# Patient Record
Sex: MALE | Race: WHITE | NOT HISPANIC OR LATINO
[De-identification: names, ages, dates, MRNs, and addresses within clinical notes are randomized per-mention and may not be internally consistent; named-entity substitution may affect disease eponyms.]

---

## 2017-05-11 ENCOUNTER — HOSPITAL (OUTPATIENT)
Dept: OTHER | Age: 62
End: 2017-05-11
Attending: EMERGENCY MEDICINE

## 2017-05-11 LAB
ANALYZER ANC (IANC): ABNORMAL
ANION GAP SERPL CALC-SCNC: 16 MMOL/L (ref 10–20)
BASOPHILS # BLD: 0 THOUSAND/MCL (ref 0–0.3)
BASOPHILS NFR BLD: 0 %
BUN SERPL-MCNC: 10 MG/DL (ref 6–20)
BUN/CREAT SERPL: 14 (ref 7–25)
CALCIUM SERPL-MCNC: 9.4 MG/DL (ref 8.4–10.2)
CHLORIDE: 96 MMOL/L (ref 98–107)
CO2 SERPL-SCNC: 30 MMOL/L (ref 21–32)
CREAT SERPL-MCNC: 0.71 MG/DL (ref 0.67–1.17)
DIFFERENTIAL METHOD BLD: ABNORMAL
EOSINOPHIL # BLD: 0 THOUSAND/MCL (ref 0.1–0.5)
EOSINOPHIL NFR BLD: 0 %
ERYTHROCYTE [DISTWIDTH] IN BLOOD: 13.2 % (ref 11–15)
GLUCOSE SERPL-MCNC: 139 MG/DL (ref 65–99)
HEMATOCRIT: 43.3 % (ref 39–51)
HGB BLD-MCNC: 14.9 GM/DL (ref 13–17)
LYMPHOCYTES # BLD: 1.2 THOUSAND/MCL (ref 1–4)
LYMPHOCYTES NFR BLD: 14 %
MCH RBC QN AUTO: 33.4 PG (ref 26–34)
MCHC RBC AUTO-ENTMCNC: 34.4 GM/DL (ref 32–36.5)
MCV RBC AUTO: 97.1 FL (ref 78–100)
MONOCYTES # BLD: 1.3 THOUSAND/MCL (ref 0.3–0.9)
MONOCYTES NFR BLD: 15 %
NEUTROPHILS # BLD: 6.3 THOUSAND/MCL (ref 1.8–7.7)
NEUTROPHILS NFR BLD: 71 %
NEUTS SEG NFR BLD: ABNORMAL %
PERCENT NRBC: ABNORMAL
PLATELET # BLD: 265 THOUSAND/MCL (ref 140–450)
POTASSIUM SERPL-SCNC: 4.1 MMOL/L (ref 3.4–5.1)
RBC # BLD: 4.46 MILLION/MCL (ref 4.5–5.9)
SODIUM SERPL-SCNC: 138 MMOL/L (ref 135–145)
WBC # BLD: 8.9 THOUSAND/MCL (ref 4.2–11)

## 2017-06-02 PROBLEM — E04.1 THYROID NODULE: Status: ACTIVE | Noted: 2017-06-02

## 2017-10-10 PROCEDURE — 81001 URINALYSIS AUTO W/SCOPE: CPT | Performed by: INTERNAL MEDICINE

## 2018-01-29 PROBLEM — N52.01 ERECTILE DYSFUNCTION DUE TO ARTERIAL INSUFFICIENCY: Status: ACTIVE | Noted: 2018-01-29

## 2018-12-31 PROCEDURE — 86060 ANTISTREPTOLYSIN O TITER: CPT | Performed by: INTERNAL MEDICINE

## 2019-07-09 PROCEDURE — 86334 IMMUNOFIX E-PHORESIS SERUM: CPT | Performed by: INTERNAL MEDICINE

## 2019-07-09 PROCEDURE — 84165 PROTEIN E-PHORESIS SERUM: CPT | Performed by: INTERNAL MEDICINE

## 2019-07-09 PROCEDURE — 83883 ASSAY NEPHELOMETRY NOT SPEC: CPT | Performed by: INTERNAL MEDICINE

## 2019-10-21 PROBLEM — Z87.891 HX OF TOBACCO USE, PRESENTING HAZARDS TO HEALTH: Status: ACTIVE | Noted: 2019-10-21

## 2021-02-04 PROBLEM — I70.0 AORTIC ATHEROSCLEROSIS (HCC): Status: ACTIVE | Noted: 2021-02-04

## 2021-02-04 PROBLEM — Z87.891 PERSONAL HISTORY OF NICOTINE DEPENDENCE: Status: ACTIVE | Noted: 2019-10-21

## 2021-02-04 PROBLEM — J84.10 LUNG GRANULOMA (HCC): Status: ACTIVE | Noted: 2021-02-04

## 2021-02-04 PROBLEM — I70.0 AORTIC ATHEROSCLEROSIS: Status: ACTIVE | Noted: 2021-02-04

## 2021-09-29 PROBLEM — S92.334A NONDISPLACED FRACTURE OF THIRD METATARSAL BONE, RIGHT FOOT, INITIAL ENCOUNTER FOR CLOSED FRACTURE: Status: ACTIVE | Noted: 2021-09-29

## 2021-10-22 PROBLEM — Z78.9 ALCOHOL CONSUMPTION OF MORE THAN FOUR DRINKS PER DAY: Status: ACTIVE | Noted: 2021-10-22

## 2021-11-04 PROBLEM — M80.00XD AGE-RELATED OSTEOPOROSIS WITH CURRENT PATHOL FX WITH ROUTINE HEALING: Status: ACTIVE | Noted: 2021-11-04

## 2021-11-04 PROBLEM — M85.89 OSTEOPENIA OF MULTIPLE SITES: Status: ACTIVE | Noted: 2021-11-04

## 2022-01-06 PROBLEM — Z87.81 HISTORY OF FOOT FRACTURE: Status: ACTIVE | Noted: 2022-01-06

## 2022-02-10 PROBLEM — R73.03 PREDIABETES: Status: ACTIVE | Noted: 2022-02-10

## 2022-02-10 PROBLEM — M85.89 OSTEOPENIA OF MULTIPLE SITES: Status: RESOLVED | Noted: 2021-11-04 | Resolved: 2022-02-10

## 2022-02-10 PROBLEM — N52.01 ERECTILE DYSFUNCTION DUE TO ARTERIAL INSUFFICIENCY: Status: RESOLVED | Noted: 2018-01-29 | Resolved: 2022-02-10

## 2022-02-10 PROBLEM — S92.334A NONDISPLACED FRACTURE OF THIRD METATARSAL BONE, RIGHT FOOT, INITIAL ENCOUNTER FOR CLOSED FRACTURE: Status: RESOLVED | Noted: 2021-09-29 | Resolved: 2022-02-10

## 2024-07-12 RX ORDER — TIZANIDINE 4 MG/1
1 TABLET ORAL EVERY 8 HOURS PRN
COMMUNITY

## 2024-07-12 RX ORDER — GABAPENTIN 300 MG/1
300 CAPSULE ORAL 3 TIMES DAILY
COMMUNITY
Start: 2024-05-10

## 2024-07-12 RX ORDER — TRAMADOL HYDROCHLORIDE 50 MG/1
1-2 TABLET ORAL NIGHTLY
COMMUNITY

## 2024-07-12 RX ORDER — ASPIRIN 81 MG/1
81 TABLET ORAL DAILY
Status: ON HOLD | COMMUNITY
End: 2024-08-02

## 2024-07-31 ENCOUNTER — ANESTHESIA EVENT (OUTPATIENT)
Dept: SURGERY | Facility: HOSPITAL | Age: 69
End: 2024-07-31
Payer: MEDICARE

## 2024-08-01 ENCOUNTER — APPOINTMENT (OUTPATIENT)
Dept: GENERAL RADIOLOGY | Facility: HOSPITAL | Age: 69
End: 2024-08-01
Attending: ORTHOPAEDIC SURGERY
Payer: MEDICARE

## 2024-08-01 ENCOUNTER — HOSPITAL ENCOUNTER (INPATIENT)
Facility: HOSPITAL | Age: 69
LOS: 1 days | Discharge: HOME | End: 2024-08-02
Attending: ORTHOPAEDIC SURGERY | Admitting: ORTHOPAEDIC SURGERY
Payer: MEDICARE

## 2024-08-01 ENCOUNTER — ANESTHESIA (OUTPATIENT)
Dept: SURGERY | Facility: HOSPITAL | Age: 69
End: 2024-08-01
Payer: MEDICARE

## 2024-08-01 DIAGNOSIS — M48.061 SPINAL STENOSIS OF LUMBAR REGION, UNSPECIFIED WHETHER NEUROGENIC CLAUDICATION PRESENT: Primary | ICD-10-CM

## 2024-08-01 PROCEDURE — 4A11X4G MONITORING OF PERIPHERAL NERVOUS ELECTRICAL ACTIVITY, INTRAOPERATIVE, EXTERNAL APPROACH: ICD-10-PCS | Performed by: ORTHOPAEDIC SURGERY

## 2024-08-01 PROCEDURE — 76000 FLUOROSCOPY <1 HR PHYS/QHP: CPT | Performed by: ORTHOPAEDIC SURGERY

## 2024-08-01 PROCEDURE — 0QS104Z REPOSITION SACRUM WITH INTERNAL FIXATION DEVICE, OPEN APPROACH: ICD-10-PCS | Performed by: ORTHOPAEDIC SURGERY

## 2024-08-01 PROCEDURE — 0QS004Z REPOSITION LUMBAR VERTEBRA WITH INTERNAL FIXATION DEVICE, OPEN APPROACH: ICD-10-PCS | Performed by: ORTHOPAEDIC SURGERY

## 2024-08-01 PROCEDURE — 0SB40ZZ EXCISION OF LUMBOSACRAL DISC, OPEN APPROACH: ICD-10-PCS | Performed by: ORTHOPAEDIC SURGERY

## 2024-08-01 PROCEDURE — 0SG30AJ FUSION OF LUMBOSACRAL JOINT WITH INTERBODY FUSION DEVICE, POSTERIOR APPROACH, ANTERIOR COLUMN, OPEN APPROACH: ICD-10-PCS | Performed by: ORTHOPAEDIC SURGERY

## 2024-08-01 PROCEDURE — 01NB0ZZ RELEASE LUMBAR NERVE, OPEN APPROACH: ICD-10-PCS | Performed by: ORTHOPAEDIC SURGERY

## 2024-08-01 RX ORDER — METHOCARBAMOL 750 MG/1
750 TABLET, FILM COATED ORAL 3 TIMES DAILY
Status: DISCONTINUED | OUTPATIENT
Start: 2024-08-01 | End: 2024-08-02

## 2024-08-01 RX ORDER — NALOXONE HYDROCHLORIDE 0.4 MG/ML
0.08 INJECTION, SOLUTION INTRAMUSCULAR; INTRAVENOUS; SUBCUTANEOUS AS NEEDED
Status: CANCELLED | OUTPATIENT
Start: 2024-08-01 | End: 2024-08-01

## 2024-08-01 RX ORDER — HYDRALAZINE HYDROCHLORIDE 20 MG/ML
10 INJECTION INTRAMUSCULAR; INTRAVENOUS EVERY 6 HOURS PRN
Status: DISCONTINUED | OUTPATIENT
Start: 2024-08-01 | End: 2024-08-02

## 2024-08-01 RX ORDER — ACETAMINOPHEN 10 MG/ML
INJECTION, SOLUTION INTRAVENOUS
Status: COMPLETED
Start: 2024-08-01 | End: 2024-08-01

## 2024-08-01 RX ORDER — LISINOPRIL 20 MG/1
20 TABLET ORAL DAILY
Status: DISCONTINUED | OUTPATIENT
Start: 2024-08-01 | End: 2024-08-02

## 2024-08-01 RX ORDER — DIPHENHYDRAMINE HCL 25 MG
25 CAPSULE ORAL EVERY 4 HOURS PRN
Status: DISCONTINUED | OUTPATIENT
Start: 2024-08-01 | End: 2024-08-02

## 2024-08-01 RX ORDER — METHOCARBAMOL 100 MG/ML
INJECTION, SOLUTION INTRAMUSCULAR; INTRAVENOUS
Status: COMPLETED
Start: 2024-08-01 | End: 2024-08-01

## 2024-08-01 RX ORDER — KETAMINE HYDROCHLORIDE 50 MG/ML
INJECTION, SOLUTION INTRAMUSCULAR; INTRAVENOUS AS NEEDED
Status: DISCONTINUED | OUTPATIENT
Start: 2024-08-01 | End: 2024-08-01 | Stop reason: SURG

## 2024-08-01 RX ORDER — BISACODYL 10 MG
10 SUPPOSITORY, RECTAL RECTAL
Status: DISCONTINUED | OUTPATIENT
Start: 2024-08-01 | End: 2024-08-02

## 2024-08-01 RX ORDER — SODIUM CHLORIDE, SODIUM LACTATE, POTASSIUM CHLORIDE, CALCIUM CHLORIDE 600; 310; 30; 20 MG/100ML; MG/100ML; MG/100ML; MG/100ML
INJECTION, SOLUTION INTRAVENOUS CONTINUOUS
Status: DISCONTINUED | OUTPATIENT
Start: 2024-08-01 | End: 2024-08-02

## 2024-08-01 RX ORDER — SODIUM CHLORIDE, SODIUM LACTATE, POTASSIUM CHLORIDE, CALCIUM CHLORIDE 600; 310; 30; 20 MG/100ML; MG/100ML; MG/100ML; MG/100ML
INJECTION, SOLUTION INTRAVENOUS CONTINUOUS
Status: DISCONTINUED | OUTPATIENT
Start: 2024-08-01 | End: 2024-08-01 | Stop reason: HOSPADM

## 2024-08-01 RX ORDER — HYDROMORPHONE HYDROCHLORIDE 1 MG/ML
0.2 INJECTION, SOLUTION INTRAMUSCULAR; INTRAVENOUS; SUBCUTANEOUS EVERY 5 MIN PRN
Status: DISCONTINUED | OUTPATIENT
Start: 2024-08-01 | End: 2024-08-01 | Stop reason: HOSPADM

## 2024-08-01 RX ORDER — METOCLOPRAMIDE HYDROCHLORIDE 5 MG/ML
10 INJECTION INTRAMUSCULAR; INTRAVENOUS EVERY 8 HOURS PRN
Status: DISCONTINUED | OUTPATIENT
Start: 2024-08-01 | End: 2024-08-02

## 2024-08-01 RX ORDER — POLYETHYLENE GLYCOL 3350 17 G/17G
17 POWDER, FOR SOLUTION ORAL DAILY PRN
Status: DISCONTINUED | OUTPATIENT
Start: 2024-08-01 | End: 2024-08-02

## 2024-08-01 RX ORDER — HYDROCODONE BITARTRATE AND ACETAMINOPHEN 5; 325 MG/1; MG/1
2 TABLET ORAL ONCE AS NEEDED
Status: DISCONTINUED | OUTPATIENT
Start: 2024-08-01 | End: 2024-08-01 | Stop reason: HOSPADM

## 2024-08-01 RX ORDER — ONDANSETRON 2 MG/ML
INJECTION INTRAMUSCULAR; INTRAVENOUS AS NEEDED
Status: DISCONTINUED | OUTPATIENT
Start: 2024-08-01 | End: 2024-08-01 | Stop reason: SURG

## 2024-08-01 RX ORDER — METOCLOPRAMIDE HYDROCHLORIDE 5 MG/ML
10 INJECTION INTRAMUSCULAR; INTRAVENOUS EVERY 8 HOURS PRN
Status: DISCONTINUED | OUTPATIENT
Start: 2024-08-01 | End: 2024-08-01 | Stop reason: HOSPADM

## 2024-08-01 RX ORDER — HYDROMORPHONE HYDROCHLORIDE 1 MG/ML
INJECTION, SOLUTION INTRAMUSCULAR; INTRAVENOUS; SUBCUTANEOUS AS NEEDED
Status: DISCONTINUED | OUTPATIENT
Start: 2024-08-01 | End: 2024-08-01 | Stop reason: SURG

## 2024-08-01 RX ORDER — HYDROMORPHONE HYDROCHLORIDE 1 MG/ML
0.8 INJECTION, SOLUTION INTRAMUSCULAR; INTRAVENOUS; SUBCUTANEOUS EVERY 2 HOUR PRN
Status: DISCONTINUED | OUTPATIENT
Start: 2024-08-01 | End: 2024-08-02

## 2024-08-01 RX ORDER — HYDROMORPHONE HYDROCHLORIDE 1 MG/ML
0.4 INJECTION, SOLUTION INTRAMUSCULAR; INTRAVENOUS; SUBCUTANEOUS EVERY 2 HOUR PRN
Status: DISCONTINUED | OUTPATIENT
Start: 2024-08-01 | End: 2024-08-02

## 2024-08-01 RX ORDER — HYDROMORPHONE HYDROCHLORIDE 1 MG/ML
0.6 INJECTION, SOLUTION INTRAMUSCULAR; INTRAVENOUS; SUBCUTANEOUS EVERY 5 MIN PRN
Status: DISCONTINUED | OUTPATIENT
Start: 2024-08-01 | End: 2024-08-01 | Stop reason: HOSPADM

## 2024-08-01 RX ORDER — OXYCODONE AND ACETAMINOPHEN 10; 325 MG/1; MG/1
1-2 TABLET ORAL EVERY 4 HOURS PRN
Qty: 30 TABLET | Refills: 0 | Status: SHIPPED | OUTPATIENT
Start: 2024-08-01

## 2024-08-01 RX ORDER — SODIUM CHLORIDE, SODIUM LACTATE, POTASSIUM CHLORIDE, CALCIUM CHLORIDE 600; 310; 30; 20 MG/100ML; MG/100ML; MG/100ML; MG/100ML
INJECTION, SOLUTION INTRAVENOUS CONTINUOUS
Status: CANCELLED | OUTPATIENT
Start: 2024-08-01

## 2024-08-01 RX ORDER — LABETALOL HYDROCHLORIDE 5 MG/ML
INJECTION, SOLUTION INTRAVENOUS AS NEEDED
Status: DISCONTINUED | OUTPATIENT
Start: 2024-08-01 | End: 2024-08-01 | Stop reason: SURG

## 2024-08-01 RX ORDER — SODIUM CHLORIDE, SODIUM LACTATE, POTASSIUM CHLORIDE, CALCIUM CHLORIDE 600; 310; 30; 20 MG/100ML; MG/100ML; MG/100ML; MG/100ML
INJECTION, SOLUTION INTRAVENOUS CONTINUOUS
Status: DISCONTINUED | OUTPATIENT
Start: 2024-08-01 | End: 2024-08-01

## 2024-08-01 RX ORDER — ONDANSETRON 2 MG/ML
4 INJECTION INTRAMUSCULAR; INTRAVENOUS EVERY 6 HOURS PRN
Status: DISCONTINUED | OUTPATIENT
Start: 2024-08-01 | End: 2024-08-01 | Stop reason: HOSPADM

## 2024-08-01 RX ORDER — HYDROMORPHONE HYDROCHLORIDE 1 MG/ML
INJECTION, SOLUTION INTRAMUSCULAR; INTRAVENOUS; SUBCUTANEOUS
Status: COMPLETED
Start: 2024-08-01 | End: 2024-08-01

## 2024-08-01 RX ORDER — ATORVASTATIN CALCIUM 20 MG/1
20 TABLET, FILM COATED ORAL NIGHTLY
Status: DISCONTINUED | OUTPATIENT
Start: 2024-08-01 | End: 2024-08-02

## 2024-08-01 RX ORDER — HYDROMORPHONE HYDROCHLORIDE 1 MG/ML
0.4 INJECTION, SOLUTION INTRAMUSCULAR; INTRAVENOUS; SUBCUTANEOUS EVERY 5 MIN PRN
Status: CANCELLED | OUTPATIENT
Start: 2024-08-01 | End: 2024-08-01

## 2024-08-01 RX ORDER — METHOCARBAMOL 100 MG/ML
1000 INJECTION, SOLUTION INTRAMUSCULAR; INTRAVENOUS ONCE
Status: COMPLETED | OUTPATIENT
Start: 2024-08-01 | End: 2024-08-01

## 2024-08-01 RX ORDER — ACETAMINOPHEN 10 MG/ML
1000 INJECTION, SOLUTION INTRAVENOUS ONCE
Status: COMPLETED | OUTPATIENT
Start: 2024-08-01 | End: 2024-08-01

## 2024-08-01 RX ORDER — LIDOCAINE HYDROCHLORIDE 10 MG/ML
INJECTION, SOLUTION EPIDURAL; INFILTRATION; INTRACAUDAL; PERINEURAL AS NEEDED
Status: DISCONTINUED | OUTPATIENT
Start: 2024-08-01 | End: 2024-08-01 | Stop reason: SURG

## 2024-08-01 RX ORDER — SENNOSIDES 8.6 MG
17.2 TABLET ORAL NIGHTLY
Status: DISCONTINUED | OUTPATIENT
Start: 2024-08-01 | End: 2024-08-02

## 2024-08-01 RX ORDER — GABAPENTIN 300 MG/1
300 CAPSULE ORAL 3 TIMES DAILY
Status: DISCONTINUED | OUTPATIENT
Start: 2024-08-01 | End: 2024-08-02

## 2024-08-01 RX ORDER — DIPHENHYDRAMINE HYDROCHLORIDE 50 MG/ML
25 INJECTION INTRAMUSCULAR; INTRAVENOUS EVERY 4 HOURS PRN
Status: DISCONTINUED | OUTPATIENT
Start: 2024-08-01 | End: 2024-08-02

## 2024-08-01 RX ORDER — ACETAMINOPHEN 500 MG
1000 TABLET ORAL ONCE
Status: DISCONTINUED | OUTPATIENT
Start: 2024-08-01 | End: 2024-08-01 | Stop reason: HOSPADM

## 2024-08-01 RX ORDER — TRANEXAMIC ACID 10 MG/ML
INJECTION, SOLUTION INTRAVENOUS AS NEEDED
Status: DISCONTINUED | OUTPATIENT
Start: 2024-08-01 | End: 2024-08-01 | Stop reason: SURG

## 2024-08-01 RX ORDER — HYDROMORPHONE HYDROCHLORIDE 1 MG/ML
0.4 INJECTION, SOLUTION INTRAMUSCULAR; INTRAVENOUS; SUBCUTANEOUS EVERY 5 MIN PRN
Status: DISCONTINUED | OUTPATIENT
Start: 2024-08-01 | End: 2024-08-01 | Stop reason: HOSPADM

## 2024-08-01 RX ORDER — ENEMA 19; 7 G/133ML; G/133ML
1 ENEMA RECTAL ONCE AS NEEDED
Status: DISCONTINUED | OUTPATIENT
Start: 2024-08-01 | End: 2024-08-02

## 2024-08-01 RX ORDER — OXYCODONE AND ACETAMINOPHEN 10; 325 MG/1; MG/1
2 TABLET ORAL EVERY 4 HOURS PRN
Status: DISCONTINUED | OUTPATIENT
Start: 2024-08-01 | End: 2024-08-02

## 2024-08-01 RX ORDER — VANCOMYCIN HYDROCHLORIDE 1 G/20ML
INJECTION, POWDER, LYOPHILIZED, FOR SOLUTION INTRAVENOUS AS NEEDED
Status: DISCONTINUED | OUTPATIENT
Start: 2024-08-01 | End: 2024-08-01 | Stop reason: HOSPADM

## 2024-08-01 RX ORDER — METHOCARBAMOL 750 MG/1
750 TABLET, FILM COATED ORAL 3 TIMES DAILY
Qty: 40 TABLET | Refills: 0 | Status: SHIPPED | OUTPATIENT
Start: 2024-08-01

## 2024-08-01 RX ORDER — DOCUSATE SODIUM 100 MG/1
100 CAPSULE, LIQUID FILLED ORAL 2 TIMES DAILY
Status: DISCONTINUED | OUTPATIENT
Start: 2024-08-01 | End: 2024-08-02

## 2024-08-01 RX ORDER — OXYCODONE AND ACETAMINOPHEN 10; 325 MG/1; MG/1
1 TABLET ORAL EVERY 4 HOURS PRN
Status: DISCONTINUED | OUTPATIENT
Start: 2024-08-01 | End: 2024-08-02

## 2024-08-01 RX ORDER — ACETAMINOPHEN 500 MG
1000 TABLET ORAL ONCE AS NEEDED
Status: DISCONTINUED | OUTPATIENT
Start: 2024-08-01 | End: 2024-08-01 | Stop reason: HOSPADM

## 2024-08-01 RX ORDER — DEXAMETHASONE SODIUM PHOSPHATE 4 MG/ML
VIAL (ML) INJECTION AS NEEDED
Status: DISCONTINUED | OUTPATIENT
Start: 2024-08-01 | End: 2024-08-01 | Stop reason: SURG

## 2024-08-01 RX ORDER — HYDROMORPHONE HYDROCHLORIDE 1 MG/ML
0.2 INJECTION, SOLUTION INTRAMUSCULAR; INTRAVENOUS; SUBCUTANEOUS EVERY 5 MIN PRN
Status: CANCELLED | OUTPATIENT
Start: 2024-08-01 | End: 2024-08-01

## 2024-08-01 RX ORDER — ALENDRONATE SODIUM 70 MG/1
70 TABLET ORAL WEEKLY
COMMUNITY
Start: 2023-09-01

## 2024-08-01 RX ORDER — NALOXONE HYDROCHLORIDE 0.4 MG/ML
0.08 INJECTION, SOLUTION INTRAMUSCULAR; INTRAVENOUS; SUBCUTANEOUS AS NEEDED
Status: DISCONTINUED | OUTPATIENT
Start: 2024-08-01 | End: 2024-08-01 | Stop reason: HOSPADM

## 2024-08-01 RX ORDER — HYDROMORPHONE HYDROCHLORIDE 1 MG/ML
0.6 INJECTION, SOLUTION INTRAMUSCULAR; INTRAVENOUS; SUBCUTANEOUS EVERY 5 MIN PRN
Status: CANCELLED | OUTPATIENT
Start: 2024-08-01 | End: 2024-08-01

## 2024-08-01 RX ORDER — MIDAZOLAM HYDROCHLORIDE 1 MG/ML
INJECTION INTRAMUSCULAR; INTRAVENOUS AS NEEDED
Status: DISCONTINUED | OUTPATIENT
Start: 2024-08-01 | End: 2024-08-01 | Stop reason: SURG

## 2024-08-01 RX ORDER — ONDANSETRON 2 MG/ML
4 INJECTION INTRAMUSCULAR; INTRAVENOUS EVERY 6 HOURS PRN
Status: DISCONTINUED | OUTPATIENT
Start: 2024-08-01 | End: 2024-08-02

## 2024-08-01 RX ORDER — HYDROCODONE BITARTRATE AND ACETAMINOPHEN 5; 325 MG/1; MG/1
1 TABLET ORAL ONCE AS NEEDED
Status: DISCONTINUED | OUTPATIENT
Start: 2024-08-01 | End: 2024-08-01 | Stop reason: HOSPADM

## 2024-08-01 RX ADMIN — KETAMINE HYDROCHLORIDE 25 MG: 50 INJECTION, SOLUTION INTRAMUSCULAR; INTRAVENOUS at 10:10:00

## 2024-08-01 RX ADMIN — TRANEXAMIC ACID 1000 MG: 10 INJECTION, SOLUTION INTRAVENOUS at 08:08:00

## 2024-08-01 RX ADMIN — HYDROMORPHONE HYDROCHLORIDE 0.5 MG: 1 INJECTION, SOLUTION INTRAMUSCULAR; INTRAVENOUS; SUBCUTANEOUS at 10:04:00

## 2024-08-01 RX ADMIN — LIDOCAINE HYDROCHLORIDE 50 MG: 10 INJECTION, SOLUTION EPIDURAL; INFILTRATION; INTRACAUDAL; PERINEURAL at 07:36:00

## 2024-08-01 RX ADMIN — HYDROMORPHONE HYDROCHLORIDE 0.5 MG: 1 INJECTION, SOLUTION INTRAMUSCULAR; INTRAVENOUS; SUBCUTANEOUS at 10:11:00

## 2024-08-01 RX ADMIN — MIDAZOLAM HYDROCHLORIDE 2 MG: 1 INJECTION INTRAMUSCULAR; INTRAVENOUS at 07:34:00

## 2024-08-01 RX ADMIN — DEXAMETHASONE SODIUM PHOSPHATE 8 MG: 4 MG/ML VIAL (ML) INJECTION at 08:01:00

## 2024-08-01 RX ADMIN — ONDANSETRON 4 MG: 2 INJECTION INTRAMUSCULAR; INTRAVENOUS at 10:04:00

## 2024-08-01 RX ADMIN — LABETALOL HYDROCHLORIDE 5 MG: 5 INJECTION, SOLUTION INTRAVENOUS at 10:25:00

## 2024-08-01 NOTE — PLAN OF CARE
ACCOMPANIED BY: self  Need signed ABC form 18 years and older  FEEDING: milk - 1%        Diet - good variety of foods  SLEEPIN hrs. at night  U/O:  normal  STOOLS: normal  SCHOOL HISTORY:  School - [employer]  Grade - college sophomore  Academic performance - normal  Extracurricular Activities - No                  CONCERNS:    Denies known Latex allergy or symptoms of Latex sensitivity.  History   Smoking Status   • Never Smoker   Smokeless Tobacco   • Not on file     Comment: no smokers            PACU admission. Dressing is clean, dry, and intact. Gel ice in place. Alert and oriented x4. Room air. Continuous pulse oximeter. Bilateral RUSTY's and SCD's in place. Up with 1-2 assist and walker. Last bowel movement 8/1. Regular diet. Physical and occupational therapy evaluations pending for 8/2. Discussed long-term and short-term goals. Spouse at bedside for support. Plan is home when medically stable.

## 2024-08-01 NOTE — BRIEF OP NOTE
Pre-Operative Diagnosis: STENOSIS LUMBAR     Post-Operative Diagnosis: STENOSIS LUMBAR      Procedure Performed:   LEFT LUMBAR 4 - LUMBAR 5 LAMINECTOMY, LUMBAR 5 - SACRUM 1 TRANSFORAMINAL LUMBAR INTERBODY FUSION BILATERAL    Surgeons and Role:     * Juan Romo MD - Primary    Assistant(s):  PA: Corazon Howard PA     Surgical Findings: lumbar stenosis     Specimen: none     Estimated Blood Loss: Blood Output: 50 mL (8/1/2024 10:22 AM)      Dictation Number:      NENO Arnold  8/1/2024  10:42 AM

## 2024-08-01 NOTE — DISCHARGE INSTRUCTIONS
Juan Romo MD        Lumbar and Thoracic Spinal Fusion Discharge Instructions   [facility] Center Telephone Number: [phone]  Activity  Mobility  Walking is highly encouraged throughout the day, as tolerated.   Smaller distances are more easily tolerated.  Your goal is to increase the distance you walk each day.  Remember to listen to your body.  Exercise caution in adverse weather or terrain conditions.    Stairs  You may ascend and descend stairs as you tolerate.   Be safe and deliberate. Hold the handrail and advance one step at a time.  Avoid step-stools and ladders.     Restrictions  No twisting, pulling, pushing, or lifting items with a force greater than 10 pounds. (~Gallon of Milk)  No lifting objects above the level of your shoulders.  No bending with your back - you may bend at the knees and hips, maintaining a straight back.  Typically, your restrictions will be lessened at your 6-week follow-up appointment, however we appreciate your compliance with restrictions until directed otherwise.   If you have been provided a walking aid such as a walker or cane, please use as directed.    Sitting  Fatigue is common after surgery and you may find respite in sitting. This is normal and encouraged. Please remember to be deliberately mobile throughout the day. Change your position frequently, as your muscles may get sore and stiff without movement.    It is recommended to sit in a chair which allows your knees to be at about the same level as your hips. This makes rising from a seated position more feasible.   Avoid overstuffed or plush chairs. Medium to firm chairs with straight backs give better support.   Recliners are OK but you may need to add pillows to avoid sinking into the chair.  Use a raised toilet seat if needed.  Change position every 20-30 minutes throughout the day (example: after sitting, stand, then you can sit again for another 20-30 minutes).    Sexual Activity  You may resume presurgical  sexual activity two weeks following surgery as tolerated and when approved by your surgeon.  Discontinue sexual activity if it causes or exacerbates your pain.     Exercise/Fitness Activity  Do not participate in this activity during the two weeks following surgery, unless instructed otherwise.  Your surgeon will lessen restrictions and progress your activity level when appropriate.     Driving  You may NOT drive while taking narcotics or muscle relaxants.  Back and leg pain have been shown to decrease breaking reaction time, particularly after surgery, however, there is no specific time to return to driving.   When you feel able and safe to drive you may return to driving. Slowly advance the complexity of your driving from short distance driving on local streets and in parking lots to longer distance driving including highway driving.     Travel  Avoid air travel and long-distance automobile travel the first two weeks following surgery.     Bracing/Corset  You may not require an immobilizer or brace for your back unless your surgeon has indicated otherwise.   If you have been provided a brace or corset  Use when out of bed except when showering.  You may wear even when toileting.  Anticipate wearing for 6-12 weeks after surgery; exact time to be determined by surgeon.   Apply/remove your brace when sitting/standing at side of your bed.     Sleeping  You will require plenty of rest and sleep after surgery.   Use the position that provides you the most comfort.  You may use a pillow under knees, between legs, or behind back (if lying on your side), for comfort.   Log roll to turn and rise from a recumbent position.   You may have difficulty sleeping at night. This is not abnormal. If you experience this, try to avoid napping during the day.   It is common to fatigue easily after surgery, this will typically improve one month after your surgery.   Maintain clean sheets and pillow cases.   Rub, with a clean towel to  dry.     Return to work  When cleared by surgeon. Discuss specific work activities with your surgeon at your follow-up visit.  Light/sedentary type work may be possible 2 weeks post-op.  Most work activities are permitted approximately 6-12 weeks after surgery.     Dressing and Wound Care  You will have a water-resistant, clear, adhesive dressing over your wound. Please leave this dressing in place for 5 days after surgery.   Remove dressing 5 days after surgery and inspect your wound. You will find glue and mesh tape over your wound, please leave these intact. The dressing should remain in place after discharge from the hospital, as long as it is intact, with a good seal, and there is no leakage.    Start dressing changes 5 days after leaving the hospital with gauze and tegaderm.  Change as needed for moisture in the dressing.    You may start showering 3 days after leaving the hospital.  Keep all dressings on while taking a shower, then place a clean and dry dressing after showering. Do not scrub over the dressing.  Always wash hands before and after dressing changes.   Watch incision for any worsening redness, increased drainage, increased warmth or opening of the incision.  Call your surgeon’s office/answering service if you notice any of these.  Do not apply lotions or ointments on or near incision.    Bathing  You may shower over the adhesive dressing (Tegaderm) that is in place starting 3 days after surgery.  Have someone inspect your dressing prior to each shower to ensure the integrity of the seal at the dressing edges. If the bandage integrity is compromised, please apply an additional adhesive over the existing dressing.   Do not submerge your wound. No tub bathing, swimming, use of steam rooms or saunas for 6 weeks following surgery and when permitted by your surgeon.  Do not scrub your incision site however you may allow soapy water to run over the site after your first post-operative visit.   Dab the  site dry with a clean towel.    Diet and Constipation Prevention  Your appetite may be poor. Your desire for food will return.  Drink plenty of liquids (water, juice) each day to prevent dehydration.  Maintain a healthy, well balanced diet that includes plenty of protein following surgery.  Foods that are high in fiber (bran, vegetables, fruit) are good ways to prevent constipation.   Use an over the counter stool softener while taking narcotics to prevent constipation; the use of medications such a Miralax or Milk of Magnesia may be needed.  An enema or glycerin suppository may be necessary if above measures do not work.  Contact your pharmacist, family doctor, or surgeon for advice.    No Smoking  Smoking will inhibit healing.  Smoking has been clearly shown to inhibit solid bony fusion and is counterproductive to the nature of your surgery.   Even one cigarette a day may cause problems.  Vaping, chewing tobacco, nicotine gum and patches will also inhibit healing.    Pain Management  Expectations  You will have incisional site pain. This is normal and expected after surgery.   You may continue to have leg symptoms which should improve over time.   It is not uncommon 48-72 hours after surgery for patients to experience worsening pain symptoms or return of leg pain/symptoms as post-surgical inflammation sets in. Do not be alarmed as this should gradually improve.   Pain after surgery is normal.  You may have some return of your pre-surgery symptoms and this is normal. Overall your pain should slowly decrease.  You may have good and bad days.  The pain medication Oxycodone may be taken every 4-6 hours as needed for pain.    You can take one extra strength (500mg) Acetaminophen every 6 hours as needed for pain, do not exceed 3500 mg per 24 hours.  If you received Norco instead of Oxycodone; you can take 1-2 tablets of Norco as needed for pain. Norco contains 325 mg of Acetaminophen, so factor that in when  supplementing extra strength Tylenol (500mg) Acetaminophen, to not exceed the 3500mg limit per 24 hours.  The muscle relaxer (Flexeril) should be taken 1-3 times daily as needed for muscle tightness and spasm.  You MUST NOT take any anti-inflammatory pain medications for 3-6 months after surgery as these can prevent spinal fusion.  Which include: Aspirin, Motrin, Advil, Aleve, Naprosyn, Voltaren, Mobic, Indocin, Meloxicam, Ibuprofen, Indomethacin, Naproxen, Diclofenac. (COMPLETE LIST IN GUIDEBOOK)      Non-medication Based Techniques  Relaxation techniques  A way to focus your attention other than on your pain. You are innately capable of producing endorphins, a naturally occurring hormone, that can decrease pain. Some examples of relaxation techniques which may result in a release of endorphins include deep breathing, listening to music, prayer, or meditation.   The use of cold therapy for 20 minutes multiple times per day is encouraged.  You may apply heat to areas of muscle spasm only. Do not apply heat directly over your wound as this can lead to swelling and increased pain.   Gentle stretching may ease muscle spasm.  The idea is to “lengthen” the muscle that is in spasm. Avoid any aggressive bending, lifting, twisting with your neck or back. Gently bending and stretching is OK.  Gentle massage applied to the muscle spasm may help reduce discomfort.    Medication  Tylenol (acetaminophen) is another excellent medication for pain. Take caution as most narcotics contain acetaminophen. You may take a combined daily maximum 3.5 grams (3500mg) of Tylenol (acetaminophen) in 24 hours. More than this can lead to liver injury.   You will be prescribed a narcotic medication. Take this as needed for breakthrough pain. Gradually wean yourself from prescription medication. You may substitute for Tylenol (acetaminophen).  Consider taking pain medication 30 minutes prior to activity to avoid incisional pain.   Take muscle  relaxants as needed only if experiencing muscle spasm.  Please allow medications 30-45 minutes to take effect.  Do NOT drink alcohol while on pain medication.  Monitor need for pain medication refills closely.   Call your pharmacy or physician’s office at least five days before you run out of pain medication. If calling physician office after 3 pm, requests will be addressed on the next business day. Calls for refills on Fridays, holidays, and weekends may result in a processing delay of your refill until the next business day.     Post-op Office Visit  Patients are routinely seen 2 weeks, 6 weeks, 3 months, 6 months, and 1 year after surgery.   You will be scheduled for a post-surgical visit two weeks after surgery. Please confirm this appointment.  It is very important that you keep this appointment.  We recommend making a list of questions to bring with you as it is not uncommon for patients to forget questions while being seen.  Schedule a one week follow up with your Primary Care Physician. It is a good opportunity to review all your medications including any new additions we may provide.     When to contact your surgeon’s team:  Temperature > 101.5°F or 38.5°C.   Increasing pain, swelling, redness, odor, or drainage from your incision.  Separation of incision.  Sudden reappearance of pain that won’t go away with pain medication.  New numbness or weakness to arms or legs.  Difficulty urinating or if incontinence of your bowel.   Headaches that worsen when standing/sitting and resolve when laid flat.  Any concerns, unanswered questions, or new problems.     Go directly to the ER or CALL 911 if you:  become short of breath  have chest pain  cough up blood  have unexplained anxiety with breathing

## 2024-08-01 NOTE — H&P
HPI:   Irving Mayer is a 68 year old male who presents for a pre-operative physical exam. Patient is to have lumbar fusion, to be done by Dr. Romo at [facility] on 8/1/24.          Revised Cardiac Risk Index (modified Pat Index):             High-risk surgical procedure (intraperitoneal, intrathoracic, suprainguinal vascular) No   History of MI or positive stress test, current chest pain secondary to myocardiac ischemia, current nitrate therapy, or EKG with pathologic Q wave No   History of CHF, pulmonary edema, PND, current rales, S3 gallop, chest xray with pulmonary vascular redistribution No   History of CVA/TIA No   Preoperative treatment with insulin No   Preoperative creatinine > 2.0 No   Score 0      Risk of major cardiac complication: 0.4%     RCRI     0 = 0.4%               1 = 0.9%               2 = 6.6%               3+ = 11%  % is defined as myocardial infarction, pulmonary edema, ventricular fibrillation/primary cardiac arrest, or complete heart block.       Patient reports no:  Chest pain  Chest discomfort  Jaw pain  Jaw discomfort  Arm pain   Arm discomfort  Shortness of breath   Dyspnea on exertion  Paroxysmal nocturnal dyspnea  Orthopnea  Palpations  Edema     Previous hx of cardiac or vascular issues are: HTN, HLD            Current Outpatient Medications   Medication Sig Dispense Refill    alendronate 70 MG Oral Tab Take 1 tablet (70 mg total) by mouth once a week. 12 tablet 4    traMADol 50 MG Oral Tab 1-2 tablets before bed. 30 tablet 0    gabapentin 300 MG Oral Cap Take 2 capsules (600 mg total) by mouth 3 (three) times daily. BEGIN ONE CAPSULE AT BEDTIME FOR DAY 1-3 DAY 4-6 TAKE ONE CAPSULE TWICE DAILY DAY 7 AND THEREAFTER- TAKE ONE CAPSULE THREE TIMES DAILY 60 capsule 0    tiZANidine 4 MG Oral Tab Take 1 tablet (4 mg total) by mouth every 8 (eight) hours as needed. 90 tablet 0    lisinopril 20 MG Oral Tab TAKE 1 TABLET BY MOUTH DAILY 90 tablet 0    simvastatin 40 MG Oral Tab  TAKE 1 TABLET BY MOUTH EVERY NIGHT 90 tablet 3    aspirin (ASPIRIN EC LOW DOSE) 81 MG Oral Tab EC Take 1 tablet (81 mg total) by mouth 2 (two) times a day. 28 tablet 0    Mesalamine 4 g Rectal Enema Place 1 enema (4 g total) rectally every evening. Insert at bedtime and attempt to retain all night. 30 enema 0    Sildenafil Citrate 50 MG Oral Tab Take 1 tablet (50 mg total) by mouth daily as needed for Erectile Dysfunction. 30 tablet 2    Calcium Carbonate-Vit D-Min (CALTRATE 600+D PLUS MINERALS) 600-800 MG-UNIT Oral Chew Tab Chew 1 tablet by mouth 2 (two) times a day.   0      Allergies:   Allergies   Seasonal                OTHER (SEE COMMENTS)    Comment:Grass , outside causes itchy eyes, stuffy nose           Past Medical History:   Diagnosis Date    Chronic airway obstruction, not elsewhere classified      Chronic obstructive pulmonary disease, unspecified COPD type ([facility]) 2016    Colitis      COPD (chronic obstructive pulmonary disease) ([facility])      DDD (degenerative disc disease), lumbosacral 2014    Essential hypertension 2016    Essential hypertension with goal blood pressure less than 130/80 2016    High blood pressure      High cholesterol      HTN (hypertension) 10/21/2014    Hypercholesterolemia      Multiple pulmonary nodules 03/10/2015    Pure hypercholesterolemia      Tobacco abuse 10/21/2014    Vitamin D deficiency 2014            Past Surgical History:   Procedure Laterality Date    BACK SURGERY   1982    COLONOSCOPY   11/10/14= Hemorrhoids     Repeat     COLONOSCOPY   10/10/18= Hemorrhoids     Repeat     COLONOSCOPY & POLYPECTOMY        KNEE SURGERY   [facility]    KNEE SURGERY Right 2023    OTHER SURGICAL HISTORY   2017     Cystoscopy- Dr. Guerrero             Family History   Problem Relation Age of Onset    Heart Attack Father               Heart Attack Mother               Alcohol and Other Disorders Associated  Mother      Other (heart failure) Brother        Social History: Social History    Tobacco Use      Smoking status: Former        Packs/day: 0.00        Years: 1 pack/day for 40.0 years (40.0 ttl pk-yrs)        Types: Cigarettes        Start date: 3/3/1975        Quit date: 3/3/2015        Years since quittin.3      Smokeless tobacco: Never    Vaping Use      Vaping status: Never Used    Alcohol use: Yes      Alcohol/week: 60.0 standard drinks of alcohol      Types: 60 Cans of beer per week      Comment: 10 beers a day    Drug use: No            REVIEW OF SYSTEMS:   Positive in BOLD  General:  fevers/chills  Skin:  skin lesions or rashes  Eyes:  visual complaints  HEENT:  nasal congestion, sinus pain, sore throat, hearing loss  Respiratory:  shortness of breath, cough, wheezing  Cardiovascular:  chest pain, exertional dyspnea, palpitations  Gastrointestinal:  nausea/vomiting, constipation, diarrhea, rectal bleeding, heartburn  Genitourinary:  dysuria, urgency, frequency, incontinence.  Musculoskeletal:  joint pain, swelling, muscle aching.  Psych:  Depression, anxiety  Hematology:  bruising or excessive bleeding  Endocrine:   excessive thirst or urination, recent weight changes, temperature intolerance        EXAM:   /85 (BP Location: Left arm, Patient Position: Sitting, Cuff Size: adult)   Pulse 81   Wt 169 lb 3.2 oz (76.7 kg)   SpO2 97%   BMI 21.72 kg/m²   GENERAL: healthy appearing, well developed, well nourished, in no apparent distress, A&Ox3  Head:  atraumatic, normocephalic  Eyes: PERRLA,EOMI, conjunctiva appeared normal, eyelids appeared normal  ENT: Oropharynx unremarkable   Neck: supple, no thyroid masses, no cervical adenopathy   Respiratory/Chest: CTAB, no wheezing,no rales  CARDIO: RRR , S1,S2 normal, S3 abscent,no murmur  Vascular: no edema  GI/Abdomen: soft, nontender, no mass, BS present  SKIN: warm, no rash  EXTREMITIES.MS: no cyanosis, clubbing or edema, Full ROM of all extremities,  no joint swelling or tenderness  Neuro/Psych: MS 5/5 throughout, sensation intact        ASSESSMENT AND PLAN:      Irving Mayer is a 68 year old male who presents for a pre-operative physical exam. Patient is to have lumbar fusion, to be done by Dr. Romo at [facility] on 8/1/24.  Patient is cleared for surgery with the risks of the procedure and anesthesia as discussed with those specific specialists.     Diagnoses and all orders for this visit:     Preop examination  -     CBC WITH DIFFERENTIAL WITH PLATELET  -     COMPREHENSIVE METABOLIC PANEL  -     PROTHROMBIN TIME (PT) AND PARTIAL THROMBOPLASTIN TIME (PTT)  -     URINALYSIS, COMPLETE WITH MICROSCOPIC EXAMINATION WITH REFLEX TO URINE CULTURE, ROUTINE  -     ELECTROCARDIOGRAM, COMPLETE  -     XR CHEST PA + LAT CHEST (CPT=71046); Standing  -     MRSA / MSSA PRE-OP  -EKG without ischemic changes. No anginal symptoms  -      Cleared for surgery     Foraminal stenosis of lumbar region  - going for fusion 8/1     Hypercholesterolemia  - continue statin     Aortic atherosclerosis (HCC)  - Continue present management     Essential hypertension  - Continue present management     Chronic obstructive pulmonary disease, unspecified COPD type (HCC)  Personal history of nicotine dependence  - quit smoking ~10 years ago  - denies further surveillance imaging  - not requiring inhalers     Ulcerative colitis without complications, unspecified location (HCC)  - symptoms well controlled  - Continue present management     Age-related osteoporosis with current pathol fx with routine healing  - on fosamax per FLS     Primary osteoarthritis, other specified site  -     PROTHROMBIN TIME (PT) AND PARTIAL THROMBOPLASTIN TIME (PTT)     Collapsed vertebra, not elsewhere classified, lumbar region, initial encounter for fracture (HCC)  -     PROTHROMBIN TIME (PT) AND PARTIAL THROMBOPLASTIN TIME (PTT)           This consult was sent back the referring physician, Dr. Romo      Dwayne Singh MD  Internal Medicine  [facility]

## 2024-08-01 NOTE — ANESTHESIA PREPROCEDURE EVALUATION
PRE-OP EVALUATION    Patient Name: Irving Mayer    Admit Diagnosis: STENOSIS LUMBAR    Pre-op Diagnosis: STENOSIS LUMBAR    LEFT LUMBAR 4 - LUMBAR 5 LAMINECTOMY, LUMBAR 5 - SACRUM 1 TRANSFORAMINAL LUMBAR INTERBODY FUSION BILATERAL    Anesthesia Procedure: LEFT LUMBAR 4 - LUMBAR 5 LAMINECTOMY, LUMBAR 5 - SACRUM 1 TRANSFORAMINAL LUMBAR INTERBODY FUSION BILATERAL (Left: Spine Lumbar)  . (Spine Lumbar)  INTRAOPERATIVE NEURO MONITORING    Surgeons and Role:     * Juan Romo MD - Primary    Pre-op vitals reviewed.  Temp: 98.7 °F (37.1 °C)  Pulse: 74  Resp: 18  BP: 165/95  SpO2: 97 %  Body mass index is 21.34 kg/m².    Current medications reviewed.  Hospital Medications:   acetaminophen (Tylenol Extra Strength) tab 1,000 mg  1,000 mg Oral Once    lactated ringers infusion   Intravenous Continuous    ceFAZolin (Ancef) 2g in 10mL IV syringe premix  2 g Intravenous Once    clonidine-EPINEPHrine-ropivacaine-ketorolac pain cocktail syringe (OR)   Injection Once (Intra-Op)       Outpatient Medications:     Medications Prior to Admission   Medication Sig Dispense Refill Last Dose    alendronate 70 MG Oral Tab Take 1 tablet (70 mg total) by mouth once a week.   7/26/2024    gabapentin 300 MG Oral Cap Take 1 capsule (300 mg total) by mouth in the morning, at noon, and at bedtime.   7/31/2024    tiZANidine 4 MG Oral Tab Take 1 tablet (4 mg total) by mouth 3 (three) times daily.   7/31/2024    traMADol 50 MG Oral Tab Take 1 tablet (50 mg total) by mouth at bedtime.   7/25/2024    aspirin 81 MG Oral Tab EC Take 1 tablet (81 mg total) by mouth daily.   7/25/2024    Calcium Carb-Cholecalciferol (CALCIUM 500 + D OR) Take by mouth.   7/25/2024    Cyanocobalamin (VITAMIN B 12 OR) Take by mouth.   7/25/2024    SIMVASTATIN 40 MG Oral Tab TAKE 1 TABLET(40 MG) BY MOUTH EVERY NIGHT 90 tablet 0 7/30/2024    lisinopril 20 MG Oral Tab Take 1 tablet (20 mg total) by mouth daily. 90 tablet 3 7/30/2024    Sildenafil Citrate 50 MG Oral Tab  Take 1 tablet (50 mg total) by mouth daily as needed for Erectile Dysfunction. 30 tablet 2        Allergies: Seasonal      Anesthesia Evaluation    Patient summary reviewed.    Anesthetic Complications           GI/Hepatic/Renal    Negative GI/hepatic/renal ROS.                             Cardiovascular      ECG reviewed.            (+) hypertension   (+) hyperlipidemia                                  Endo/Other    Negative endo/other ROS.                              Pulmonary        (+) COPD                   Neuro/Psych    Negative neuro/psych ROS.                                  Past Surgical History:   Procedure Laterality Date    Back surgery      Colonoscopy  11/10/14= Hemorrhoids    Repeat     Colonoscopy  10/10/18= Hemorrhoids    Repeat     Colonoscopy & polypectomy      Knee surgery      [facility] [place]    Other surgical history  2017    Cystoscopy- Dr. Guerrero     Other surgical history  2023    R knee arthroscopy with partial medial menisectomy     Social History     Socioeconomic History    Marital status:     Number of children: 3    Highest education level: High school graduate   Occupational History    Occupation: animal hospital technician     Comment: retired   Tobacco Use    Smoking status: Former     Current packs/day: 0.00     Average packs/day: 1 pack/day for 40.0 years (40.0 ttl pk-yrs)     Types: Cigarettes     Start date: 3/3/1975     Quit date: 3/3/2015     Years since quittin.4    Smokeless tobacco: Never   Vaping Use    Vaping status: Never Used   Substance and Sexual Activity    Alcohol use: Yes     Alcohol/week: 60.0 standard drinks of alcohol     Types: 60 Cans of beer per week     Comment: 8-10  daily    Drug use: No     History   Drug Use No     Available pre-op labs reviewed.               Airway      Mallampati: II  Mouth opening: >3 FB  TM distance: > 6 cm  Neck ROM: full Cardiovascular    Cardiovascular exam normal.  Rhythm:  regular  Rate: normal     Dental    Dentition appears grossly intact         Pulmonary    Pulmonary exam normal.  Breath sounds clear to auscultation bilaterally.               Other findings              ASA: 2   Plan: general  NPO status verified and patient meets guidelines.    Post-procedure pain management plan discussed with surgeon and patient.      Plan/risks discussed with: patient                Present on Admission:  **None**

## 2024-08-01 NOTE — CONSULTS
General Medicine Consult      Reason for consult:    Consulted by: Juan Romo MD    PCP: Dwayne Singh MD      History of Present Illness: Patient is a 68 year old male with pmh of COPD< HTN, HL, hx tobacco abuse is being seen for periop med management after undergoing LEFT LUMBAR 4 - LUMBAR 5 LAMINECTOMY, LUMBAR 5 - SACRUM 1 TRANSFORAMINAL LUMBAR INTERBODY FUSION BILATERAL by Dr. Romo earlier today. Tolerated procedure well. Denies pain/numbness in BLE. Feeling groggy as he's still waking up from anesthesia.       PMH:  Past Medical History:    Back problem    Chronic airway obstruction, not elsewhere classified    Chronic obstructive pulmonary disease, unspecified COPD type (HCC)    COPD (chronic obstructive pulmonary disease) (HCC)    DDD (degenerative disc disease), lumbosacral    Essential hypertension    Essential hypertension with goal blood pressure less than 130/80    High blood pressure    High cholesterol    HTN (hypertension)    Hypercholesterolemia    Multiple pulmonary nodules    Pure hypercholesterolemia    Tobacco abuse    Visual impairment    Vitamin D deficiency        PSH:  Past Surgical History:   Procedure Laterality Date    Back surgery  1982    Colonoscopy  11/10/14= Hemorrhoids    Repeat 2019    Colonoscopy  10/10/18= Hemorrhoids    Repeat 2023    Colonoscopy & polypectomy      Knee surgery  1980    [facility]    Other surgical history  11/13/2017    Cystoscopy- Dr. Guerrero     Other surgical history  07/06/2023    R knee arthroscopy with partial medial menisectomy        Home Medications:  Outpatient Medications Marked as Taking for the 8/1/24 encounter (Hospital Encounter)   Medication Sig Dispense Refill    alendronate 70 MG Oral Tab Take 1 tablet (70 mg total) by mouth once a week.      oxyCODONE-acetaminophen  MG Oral Tab Take 1-2 tablets by mouth every 4 (four) hours as needed for Pain. 30 tablet 0    methocarbamol 750 MG Oral Tab Take 1 tablet (750 mg  total) by mouth 3 (three) times daily. 40 tablet 0    gabapentin 300 MG Oral Cap Take 1 capsule (300 mg total) by mouth in the morning, at noon, and at bedtime.      tiZANidine 4 MG Oral Tab Take 1 tablet (4 mg total) by mouth every 8 (eight) hours as needed.      traMADol 50 MG Oral Tab Take 1-2 mg by mouth at bedtime.      aspirin 81 MG Oral Tab EC Take 1 tablet (81 mg total) by mouth daily.      Calcium Carb-Cholecalciferol (CALCIUM 500 + D OR) Take 1 tablet by mouth daily.      Cyanocobalamin (VITAMIN B 12 OR) Take 1 tablet by mouth daily.      SIMVASTATIN 40 MG Oral Tab TAKE 1 TABLET(40 MG) BY MOUTH EVERY NIGHT 90 tablet 0    lisinopril 20 MG Oral Tab Take 1 tablet (20 mg total) by mouth daily. 90 tablet 3       Scheduled Medication:   sennosides  17.2 mg Oral Nightly    docusate sodium  100 mg Oral BID    methocarbamol  750 mg Oral TID    ceFAZolin  2 g Intravenous Q8H    gabapentin  300 mg Oral TID    lisinopril  20 mg Oral Daily    atorvastatin  20 mg Oral Nightly     Continuous Infusing Medication:   lactated ringers Stopped (24 1112)    lactated ringers       PRN Medication:  sodium chloride    polyethylene glycol (PEG 3350)    magnesium hydroxide    bisacodyl    fleet enema    ondansetron    metoclopramide    diphenhydrAMINE **OR** diphenhydrAMINE    benzocaine-menthol    HYDROmorphone **OR** HYDROmorphone    oxyCODONE-acetaminophen **OR** oxyCODONE-acetaminophen    hydrALAzine     ALL:  Allergies   Allergen Reactions    Seasonal         Soc Hx:  Social History     Tobacco Use    Smoking status: Former     Current packs/day: 0.00     Average packs/day: 1 pack/day for 40.0 years (40.0 ttl pk-yrs)     Types: Cigarettes     Start date: 3/3/1975     Quit date: 3/3/2015     Years since quittin.4    Smokeless tobacco: Never   Substance Use Topics    Alcohol use: Yes     Alcohol/week: 60.0 standard drinks of alcohol     Types: 60 Cans of beer per week     Comment: 8-10  daily        Fam Hx  Family  History   Problem Relation Age of Onset    Heart Attack Father             Heart Attack Mother             Alcohol and Other Disorders Associated Mother     Other (heart failure) Brother        Review of Systems  Comprehensive ROS reviewed and negative except for what's stated above.  Including negative for chest pain, shortness of breath, syncope.       OBJECTIVE:  BP (!) 183/88 (BP Location: Left arm)   Pulse 76   Temp 97.8 °F (36.6 °C) (Oral)   Resp 17   Ht 6' 2\" (1.88 m)   Wt 166 lb (75.3 kg)   SpO2 96%   BMI 21.31 kg/m²   General:  Alert, no distress, appears stated age.   Head:  Normocephalic, without obvious abnormality, atraumatic.   Eyes:  Sclera anicteric, No conjunctival pallor, EOMs intact.    Nose: Nares normal. Septum midline. Mucosa normal. No drainage.   Throat: Lips, mucosa, and tongue normal. Teeth and gums normal.   Neck: Supple, symmetrical, trachea midline, no cervical or supraclavicular lymph adenopathy, thyroid: no enlargment/tenderness/nodules appreciated   Lungs:   Clear to auscultation bilaterally. Normal effort   Chest wall:  No tenderness or deformity.   Heart:  Regular rate and rhythm, S1, S2 normal, no murmur, rub or gallop appreciated   Abdomen:   Soft, non-tender. Bowel sounds normal. No masses,  No organomegaly. Non distended   Extremities: Extremities normal, atraumatic, no cyanosis or edema.   Skin: Skin color, texture, turgor normal. No rashes or lesions.    Neurologic: Normal strength, no focal deficit appreciated       Diagnostics:   CBC/Chem  No results for input(s): \"WBC\", \"HGB\", \"MCV\", \"PLT\", \"BAND\", \"INR\" in the last 168 hours.    Invalid input(s): \"LYM#\", \"MONO#\", \"BASOS#\", \"EOSIN#\"    No results for input(s): \"NA\", \"K\", \"CL\", \"CO2\", \"BUN\", \"CREATSERUM\", \"GLU\", \"CA\", \"CAION\", \"MG\", \"PHOS\" in the last 168 hours.    No results for input(s): \"ALT\", \"AST\", \"ALB\", \"AMYLASE\", \"LIPASE\", \"LDH\" in the last 168 hours.    Invalid input(s): \"ALPHOS\", \"TBIL\",  \"DBIL\", \"TPROT\"    Radiology:   All imaging personally reviewed      XR FLUOROSCOPY C-ARM TIME LESS THAN 1 HOUR (CPT=76000)    Result Date: 8/1/2024  PROCEDURE:  XR FLUOROSCOPY C-ARM TIME <1 HOUR  (CPT=76000)  INDICATIONS:  Lumbar fusion  COMPARISON:  None.   TECHNIQUE:  FLUOROSCOPY IMAGES OBTAINED:  11 FLUOROSCOPY TIME:  2 minutes 14 seconds TECHNOLOGIST TIME:  2 hours 30 minutes RADIATION DOSE (AIR KERMA PRODUCT):  115.48mGy   FINDINGS:  Fluoroscopy provided for guidance. No radiologist was present for the procedure. There are expected intraoperative changes present. Please refer to the operative report for further details.            CONCLUSION:  See above.   LOCATION:  [facility]    Dictated by (CST): Tylor Mitchell MD on 8/01/2024 at 10:21 AM     Finalized by (CST): Tylor Mitchell MD on 8/01/2024 at 10:21 AM            ASSESSMENT / PLAN:    68 year old male with pmh of COPD< HTN, HL, hx tobacco abuse is being seen for periop med management after undergoing LEFT LUMBAR 4 - LUMBAR 5 LAMINECTOMY, LUMBAR 5 - SACRUM 1 TRANSFORAMINAL LUMBAR INTERBODY FUSION BILATERAL     S/p LEFT LUMBAR 4 - LUMBAR 5 LAMINECTOMY, LUMBAR 5 - SACRUM 1 TRANSFORAMINAL LUMBAR INTERBODY FUSION BILATERAL   - PO/IV meds for pain control per surgery, wean to oral meds as able  - Adv diet as tolerated per surgery, zofran prn for nausea, OBR  - PT/OT/SW  - monitor for acute blood loss anemia, cbc in am  - sSCD for DVT prophy  - further management per surgery    Essential Hypertension  - BP parameters placed for BP meds to prevent hypotension  - Monitor on IV pain medications due to venodilatory effect  - Hemodynamically stable  - Resume PTA antihypertensives (lisinopril)     COPD  - home inhalers prn     HTN  - statin    Hx tobacco abuse      Outpatient records reviewed confirming patient's medical history and medications.     Further recommendations pending patient's clinical course.  [facility] hospitalist to continue to follow patient while in house    Patient  and/or patient's family given opportunity to ask questions and note understanding and agreeing with therapeutic plan as outlined      Thank you for allowing me to participate in the care of this patient.     Thank You,      Hui Anna MD  [facility]  Message over [employer]  Pager: [phone]

## 2024-08-01 NOTE — OPERATIVE REPORT
Operative Note     Patient Name:Irving Mayer  Date: 8/1/2024  Preoperative Diagnosis:   Lumbar Stenosis with Neurogenic Claudication  Lumbar degenerative segmental kyphosis   Lumbar Foraminal Stenosis with Radiculopathy    Postoperative Diagnosis: Same  Primary Surgeon: Juan Romo MD  Assistant: Corazon LAZCANO    Procedures: L5-S1 TLIF with smith karimi osteotomy, L4-5 laminoforaminotomy     - Lumbar decompression/TLIF L5-S1      CPT 33559  - Posterior Instrumentation L5-S1                      CPT 40427  - Interbody biomechanical device      CPT 22853 x 1  - L4-5 laminoforaminotomy       CPT 76947  - L5-S1 smith karimi osteotomy      CPT 63406  - Use of Local Bone Autograft                            CPT 65822   - Use of Allograft Material        CPT 05710        Anesthesia: General Endotracheal Anesthesia  Estimated Blood Loss: 50cc  Drains: None  Implants: Atec pedicle screws x 4 rods x 2, PSX cage x 2  Bone Graft:DBM Allograft autograft   Specimen: None  Condition: Stable  Complications: None      Surgical Indications:Irving Mayer is a  68 year old malewho presented with a history of lumbar stenosis and neurogenic claudication/radiculopathy in the setting of lumbar spondylolisthesis, with symptoms refractory to nonsurgical care.  The workup including lumbar radiographs and MRI demonstrating spondylolisthesis with stenosis. The option of surgery was discussed with the patient.  Risks, benefits, and alternatives of surgery were discussed with the patient, which include but are not limited to bleeding, infection, DVT/PE, dural tears, neurologic injury, worsening of neurological status, risk of instability, need for subsequent surgery, fracture, failure of fusion, hardware failure, risks associated with anesthesia, including death, which were all reviewed with the patient and she consented to proceed with surgery.     Surgical Procedure:              The patient was met in the preop holding  area, we reviewed elements of the patient's history and physical examination along with the planned surgical procedure. The patient was in agreement and the surgical site was marked with indelible ink.  The patient was administered prophylactic antibiotics per SCIP guidelines.  After successful induction of general endotracheal anesthesia, a leon catheter was placed and sequential compression devices were applied to bilateral lower extremities.  The patient was then placed in the prone position on the Aden spine frame.  The patient's orbits, peripheral nerves, and bony prominences were padded and protected.  The back was then prepped and draped in the usual sterile fashion.  A safety timeout was performed identifying the patient by name, MRN, and date of birth; the nature of the procedure, surgical site, and concerns were addressed with the operating room staff.  All were in agreement and we proceeded with the procedure.    Using a spinal needle a fluoroscopic image  was taken to localize the skin incision relative to the patient's anatomy. The skin was incised with a scalpel and subperiosteal dissection was carried out with electrocautery exposing the L4/5 left interlaminar space. Curved curette was placed in the L4-5 left interlaminar space and fluoroscopic image confirmed appropriate level.  A partial laminotomy was performed on the left side at L4/5 until the top of the ligamentum flavum was exposed. Ligamentum was then released with a curved curette and then removed .  A partial medial facetectomy was performed out to the medial wall of the  pedicle.  The ligamentum flavum was removed en bloc. A rebollar ball could be passed around the thecal sac, root, and out to the neural foramen without any resistance.This completed the laminoforaminotomy.    Next attention turned to the L5-S1 TLIF.                  AP and lateral flouroscopic images were taken throughout the procedure to aid in instrumentation placement.  Perfect APs werer used to carlito out the lateral border of the pedicles bilaterally at the proposed treated levels. Skin incision was made 1cm lateral to this marks. Electocautery was used to dissect through the subcutaneous tissue and the fascia. Blunt finger dissection was used to develop the plane between the multifidus and remaining paraspinal muscles until the facet joint, pars and transverse process was palpated. Pedicle screws were placed using standard fluoroscopically guided pedicle screw technique.  Jamshidis' were placed at the lateral border of the pedicle and inserted to a depth of 25-30mm using AP flouro imaging. Kwires were then inserted. This was completed for all the remaining pedicles bilaterally. Lateral flouro image was taken to ensure appropriate wire trajectory. Pedicles screws of appropriate diameter and length (as assessed using pre operative imaging) were then placed at L5-S1 and positioned confirmed with AP/Lat flouro images and EMG probing of screw heads. All screws simulated >12mAMPs.  This was mixed with our local bone graft and allograft to improve osteogenic potential.      With the appropriate screws placed, attention was directed to the L5-S1 smith karimi osteotomy. A table mounted retractor was secured to the bilateral screws and secured to the table for rigidity. A medial blade was used after elevation of the multifidus off the lamina. The inferior articular process of L5 and superior and the superior articular process was osteotomized. Bone was removed, milled and saved for later bone grafting. A central laminectomy was performed with high speed bur until top of ligamentum flavum was exposed.  The ligamentum flavum was removed exposing the dura medially and decompressing the traversing nerve root. This completed the barboza karimi osteotomy.     Next, attention turned to the L5-S1 TLIF. The exiting nerve root was exposed and decompressed.  The thecal sac and nerve roots were then  protected with neurosponges, and gently retracted to reveal the annulus of the intervertebral disk. An annulotomy was performed.  With the sequential use of pituitary rongeurs, disc kristin, a variety of curettes, and rasps, a thorough discectomy ensued with care not to violate the subchondral endplates of the adjacent vertebral bodies. With the endplates prepared, the disc space was then appropriately sized with sequential trialing under fluoroscopic control. The integrity of the anterior annulus was inspected and then biologic allograft and local bone graft was delivered to the anterior disc space. An appropriate corresponding interbody implant was then selected, packed with bone graft on the back table, and then implanted under fluoroscopic control. The remaining disk space was then backfilled with bone graft. The wound was then copiously irrigated. 100mg of vanco powder was used in the wound,  the remaining floseal and placed over the nerve roots.      2 rods of appropriate length were selected and contoured to shape and reduced to the tulip heads. The caps were then tightened to the screw 's predetermined specification utilizing a torque . The construct was the evaluated fluoroscopically and determined to be appropriate.                   All bleeders were controlled using bipolar electrocautery and floseal.  There was no active bleeding.  Closure was done in layers with interrupted 0 Vicryl for the fascia, 2-0 Vicryl for the subcutaneous tissue.  The subcutaneous layer was injected with 0.375% Marcaine and the skin was closed with staples.  Xeroform and a sterile dressing were then applied.  The patient was woken from anesthesia and transferred to the PACU in stable condition.                   A physician assistant was necessary during the case to provide positioning, exposure, hemostasis, safety and retraction and to manage wound suction so that I could operate with two hands. The PA aided  in screw targeting on their side and taye insertion     Juan Romo MD  Division of Spine Surgery  [facility]

## 2024-08-01 NOTE — ANESTHESIA PROCEDURE NOTES
Airway  Date/Time: 8/1/2024 7:40 AM  Urgency: elective    Airway not difficult    General Information and Staff    Patient location during procedure: OR  Anesthesiologist: Cleveland Conklin MD  Performed: anesthesiologist   Performed by: Cleveland Conklin MD  Authorized by: Cleveland Conklin MD      Indications and Patient Condition  Indications for airway management: anesthesia  Sedation level: deep  Preoxygenated: yes  Patient position: sniffing  Mask difficulty assessment: 1 - vent by mask    Final Airway Details  Final airway type: endotracheal airway      Successful airway: ETT  Cuffed: yes   Successful intubation technique: direct laryngoscopy  Endotracheal tube insertion site: oral  Blade: Barbie  Blade size: #3  ETT size (mm): 7.5    Cormack-Lehane Classification: grade I - full view of glottis  Placement verified by: capnometry   Cuff volume (mL): 5  Measured from: lips  ETT to lips (cm): 22  Number of attempts at approach: 1  Number of other approaches attempted: 0

## 2024-08-01 NOTE — ANESTHESIA POSTPROCEDURE EVALUATION
[facility]    Irving Mayer Patient Status:  Inpatient   Age/Gender 68 year old male MRN [id number]   Location [facility] POST ANESTHESIA CARE UNIT Attending Juan Romo MD   Hosp Day # 0 PCP Dwayne Singh MD       Anesthesia Post-op Note    LEFT LUMBAR 4 - LUMBAR 5 LAMINECTOMY, LUMBAR 5 - SACRUM 1 TRANSFORAMINAL LUMBAR INTERBODY FUSION BILATERAL    Procedure Summary       Date: 08/01/24 Room / Location:  MAIN OR 11 / [facility] MAIN OR    Anesthesia Start: 0734 Anesthesia Stop:     Procedures:       LEFT LUMBAR 4 - LUMBAR 5 LAMINECTOMY, LUMBAR 5 - SACRUM 1 TRANSFORAMINAL LUMBAR INTERBODY FUSION BILATERAL (Left: Spine Lumbar)      . (Spine Lumbar)      INTRAOPERATIVE NEURO MONITORING Diagnosis: (STENOSIS LUMBAR)    Surgeons: Juan Romo MD Anesthesiologist: Cleveland Conklin MD    Anesthesia Type: general ASA Status: 2            Anesthesia Type: general    Vitals Value Taken Time   /93 08/01/24 1112   Temp 98.9 °F (37.2 °C) 08/01/24 1112   Pulse 73 08/01/24 1112   Resp 15 08/01/24 1112   SpO2 93 % 08/01/24 1112   Vitals shown include unfiled device data.    Patient Location: PACU    Anesthesia Type: general    Airway Patency: patent    Postop Pain Control: adequate    Mental Status: mildly sedated but able to meaningfully participate in the post-anesthesia evaluation    Nausea/Vomiting: none    Cardiopulmonary/Hydration status: stable euvolemic    Complications: no apparent anesthesia related complications    Postop vital signs: stable    Dental Exam: Unchanged from Preop    Patient to be discharged from PACU when criteria met.

## 2024-08-02 VITALS
OXYGEN SATURATION: 98 % | DIASTOLIC BLOOD PRESSURE: 61 MMHG | WEIGHT: 166 LBS | RESPIRATION RATE: 16 BRPM | BODY MASS INDEX: 21.3 KG/M2 | HEART RATE: 61 BPM | SYSTOLIC BLOOD PRESSURE: 118 MMHG | TEMPERATURE: 98 F | HEIGHT: 74 IN

## 2024-08-02 LAB
HCT VFR BLD AUTO: 36 %
HGB BLD-MCNC: 12 G/DL

## 2024-08-02 PROCEDURE — 97165 OT EVAL LOW COMPLEX 30 MIN: CPT

## 2024-08-02 PROCEDURE — 85014 HEMATOCRIT: CPT | Performed by: PHYSICIAN ASSISTANT

## 2024-08-02 PROCEDURE — 85018 HEMOGLOBIN: CPT | Performed by: PHYSICIAN ASSISTANT

## 2024-08-02 PROCEDURE — 97535 SELF CARE MNGMENT TRAINING: CPT

## 2024-08-02 PROCEDURE — 97530 THERAPEUTIC ACTIVITIES: CPT

## 2024-08-02 PROCEDURE — 97116 GAIT TRAINING THERAPY: CPT

## 2024-08-02 PROCEDURE — 97161 PT EVAL LOW COMPLEX 20 MIN: CPT

## 2024-08-02 RX ORDER — ASPIRIN 81 MG/1
81 TABLET ORAL DAILY
Status: SHIPPED | COMMUNITY
Start: 2024-08-06

## 2024-08-02 NOTE — PROGRESS NOTES
Patient cleared for discharge. Discharge education completed at bedside with patient and spouse, all questions answered. IV removed. Belongings sent home with patient.

## 2024-08-02 NOTE — DISCHARGE SUMMARY
[facility] Hospitalist Discharge Summary     Patient ID:  Irving Mayer  68 year old  8/10/1955    Admit date: 8/1/2024    Discharge date and time: 08/02/24     Attending Physician: Juan Romo MD     Primary Care Physician: Dwayne Singh MD     Discharge Diagnoses: STENOSIS LUMBAR  Spinal stenosis, lumbar    Please note that only [facility] DMG and EMG patients enrolled in the Medicare ACO, [facility] ACO and [facility] HMOs will be handled by the [facility] Care Management team.  For all other patients, please follow usual protocol for discharge care transition.    Discharge Condition: stable    Disposition:  home    Important Follow up:  - PCP within 2 weeks              Hospital Course:        68 year old male with pmh of COPD< HTN, HL, hx tobacco abuse is being seen for periop med management after undergoing LEFT LUMBAR 4 - LUMBAR 5 LAMINECTOMY, LUMBAR 5 - SACRUM 1 TRANSFORAMINAL LUMBAR INTERBODY FUSION BILATERAL by Dr. Romo earlier today. Tolerated procedure well. Denies pain/numbness in BLE. Feeling groggy as he's still waking up from anesthesia.     S/p LEFT LUMBAR 4 - LUMBAR 5 LAMINECTOMY, LUMBAR 5 - SACRUM 1 TRANSFORAMINAL LUMBAR INTERBODY FUSION BILATERAL   - PO/IV meds for pain control per surgery, wean to oral meds as able  - Adv diet as tolerated per surgery, zofran prn for nausea, OBR  - PT/OT/SW  - resume pta asa when cleared by ortho  - sSCD for DVT prophy  - further management per surgery     Essential Hypertension  - BP parameters placed for BP meds to prevent hypotension  - Monitor on IV pain medications due to venodilatory effect  - Hemodynamically stable  - Resume PTA antihypertensives (lisinopril)      COPD  - home inhalers prn      HTN  - statin     Hx tobacco abuse    Consults: IP CONSULT TO HOSPITALIST  IP CONSULT TO RESPIRATORY CARE    Operative Procedures: Procedure(s) (LRB):  LEFT LUMBAR 4 - LUMBAR 5 LAMINECTOMY, LUMBAR 5 - SACRUM 1  TRANSFORAMINAL LUMBAR INTERBODY FUSION BILATERAL (Left)  . (N/A)  INTRAOPERATIVE NEURO MONITORING (N/A)       Patient instructions:      I as the attending physician reconciled the current and discharge medications on day of discharge.     Current Discharge Medication List        START taking these medications    Details   oxyCODONE-acetaminophen  MG Oral Tab Take 1-2 tablets by mouth every 4 (four) hours as needed for Pain.      methocarbamol 750 MG Oral Tab Take 1 tablet (750 mg total) by mouth 3 (three) times daily.           CONTINUE these medications which have NOT CHANGED    Details   alendronate 70 MG Oral Tab Take 1 tablet (70 mg total) by mouth once a week.      gabapentin 300 MG Oral Cap Take 1 capsule (300 mg total) by mouth in the morning, at noon, and at bedtime.      tiZANidine 4 MG Oral Tab Take 1 tablet (4 mg total) by mouth every 8 (eight) hours as needed.      traMADol 50 MG Oral Tab Take 1-2 mg by mouth at bedtime.      aspirin 81 MG Oral Tab EC Take 1 tablet (81 mg total) by mouth daily.      Calcium Carb-Cholecalciferol (CALCIUM 500 + D OR) Take 1 tablet by mouth daily.      Cyanocobalamin (VITAMIN B 12 OR) Take 1 tablet by mouth daily.      SIMVASTATIN 40 MG Oral Tab TAKE 1 TABLET(40 MG) BY MOUTH EVERY NIGHT      lisinopril 20 MG Oral Tab Take 1 tablet (20 mg total) by mouth daily.      Sildenafil Citrate 50 MG Oral Tab Take 1 tablet (50 mg total) by mouth daily as needed for Erectile Dysfunction.             Activity: activity as tolerated  Diet: regular diet  Wound Care: as directed  Code Status: No Order      Discharge Exam:     General: no acute distress, alert and oriented x 3  Heart: RRR  Lungs: clear bilaterally, no active wheezing  Abdomen: nontender, nondistended, intact BS  Extremities: no pedal edema   Neuro: CN inact, no focal deficits      Total time coordinating care for discharge: Greater than 30 minutes    Hui Anna MD  [facility]

## 2024-08-02 NOTE — PLAN OF CARE
A&Ox4. VSS. On room air. . IS encouraged. Teds and SCDs. Ankle pumps encouraged. Tolerating diet. Last BM today. Voiding. Pain managed with PO medication. Surgical dressing to back C/D/I. Ambulating with standby assist. Plan is for PT/OT eval. Patient updated and in agreement with plan of care. Safety precautions in place. Instructed patient to call for assistance, call light within reach.

## 2024-08-02 NOTE — OCCUPATIONAL THERAPY NOTE
OCCUPATIONAL THERAPY EVALUATION - INPATIENT    Room Number: 357/357-A  Evaluation Date: 8/2/2024     Type of Evaluation: Initial  Presenting Problem: s/p L5-S1 TLIF with smith karimi osteotomy, L4-5 laminoforaminotomy 8/1/24    Physician Order: IP Consult to Occupational Therapy  Reason for Therapy:  ADL/IADL Dysfunction and Discharge Planning      OCCUPATIONAL THERAPY ASSESSMENT   Patient is a 68 year old male admitted on 8/1/2024 with Presenting Problem: s/p L5-S1 TLIF with smith karimi osteotomy, L4-5 laminoforaminotomy 8/1/24. Co-Morbidities : HTN, COPD, spinal stenosis  Patient is currently functioning near baseline with toileting, upper body dressing, lower body dressing, grooming, transfers, static sitting balance, dynamic sitting balance, static standing balance, dynamic standing balance, maintaining seated position, functional standing tolerance, energy conservation strategies, and aerobic capacity.  Prior to admission, patient's baseline is IND c ADLs/IADLs. Pt lives at home with supportive spouse and is retired. Pt was driving prior to this admission.  Patient met all OT goals at supervision level.  Patient reports no further questions/concerns at this time.     Patient will be discharged from acute OT services at this time.      WEIGHT BEARING RESTRICTION  Weight Bearing Restriction: None                Recommendations for nursing staff:   Transfers: up c x1 person, RW, supervision  Toileting location: Toilet    EVALUATION SESSION:  Patient at start of session: Seated upright in chair.  FUNCTIONAL TRANSFER ASSESSMENT  Sit to Stand: Chair  Chair: Supervision  Toilet Transfer: Stand-by Assist    BED MOBILITY     BALANCE ASSESSMENT  Static Sitting: Supervision  Sitting Bilateral: Supervision  Static Standing: Supervision  Standing Bilateral: Supervision    FUNCTIONAL ADL ASSESSMENT  Eating: Independent  Grooming Standing: Supervision  UB Dressing Seated: Supervision  LB Dressing Seated: Modified  Independent (utilized reacher and trialed sock aide)  LB Dressing Standing: Supervision  Toileting Seated: Supervision      ACTIVITY TOLERANCE: Pt completed UB and LB dressing DYLAN with use of reacher and sock aide, completed sit <> stand with supervision and RW, and ambulated to bathroom to complete toilet transfer with supervision. Pt reported a pain rating of 3/10 at beginning of session.                         O2 SATURATIONS       COGNITION  Overall Cognitive Status:  WFL - within functional limits  COGNITION ASSESSMENTS       Upper Extremity:   ROM: within functional limits  Strength: is within functional limits  Coordination:  Gross motor: WFL  Fine motor: WFL  Sensation: Light touch:  intact    EDUCATION PROVIDED  Patient: Role of Occupational Therapy; Plan of Care; Discharge Recommendations; Adaptive Equipment Recommendations; DME Recommendations; Functional Transfer Techniques; Fall Prevention; Surgical Precautions; Posture/Positioning; Compensatory ADL Techniques; Energy Conservation; Proper Body Mechanics  Patient's Response to Education: Verbalized Understanding; Returned Demonstration  Family/Caregiver: -- (Same)  Family/Caregiver's Response to Education: Verbalized Understanding    Equipment used: RW, reacher, sock aide  Demonstrates functional use    Therapist comments: Pt pleasant and motivated during session today. Pt asked to recall spinal precautions; pt demonstrated ability to remember \"No BLTs\". Pt completed LB dressing with supervision, use of reacher, and verbal cues for proper technique to maintain spinal precautions. Pt trialed use of sock aide for donning socks; pt unsuccessful with attempt as foot began to get stuck and reports his spouse will be willing to assist him when needed. Pt completed UB dressing seated in chair with supervision. Pt completed sit <> stand transfer from chair with RW and supervision to prepare for ambulation to bathroom to complete toilet transfer. Pt completed  toilet transfer with RW and supervision and verbal cues to utilize grab bar for extra support. Pt demonstrated good body mechanics to maintain spinal precautions when lowering self onto toilet. Pt educated on wiping techniques to maintain spinal precautions; pt verbalized understanding. Pt recommended on DME/AE to obtain for safe completion of ADLs once home; pt verbalized understanding and in agreement. Pt spouse present in room during session and reports she is willing to assist with pt when needed. Pt received AE/DME handout at end of session.    Patient End of Session: Up in chair;Needs met;Call light within reach;RN aware of session/findings;All patient questions and concerns addressed;Alarm set;Family present    OCCUPATIONAL PROFILE    HOME SITUATION  Type of Home: House  Home Layout: Two level  Lives With: Spouse;Family    Toilet and Equipment: Standard height toilet;Comfort height toilet  Shower/Tub and Equipment: Tub-shower combo  Other Equipment: None    Occupation/Status: Retired  Hand Dominance: Left  Drives: Yes  Patient Regularly Uses: Glasses    Prior Level of Function: previously IND c ADLs/IADLs. Pt is retired and lives at home with supportive spouse. Pt was driving prior to this admission.    SUBJECTIVE  \"This is gonna cost me an arm and a leg\" in a jokingly manner when mentioning AE/DME.    PAIN ASSESSMENT  Rating: 3  Location: Lower back region  Management Techniques: Ice;Body mechanics;Activity promotion;Repositioning    OBJECTIVE  Precautions: Spine;Bed/chair alarm  Fall Risk: Standard fall risk    WEIGHT BEARING RESTRICTION  Weight Bearing Restriction: None                AM-PAC ‘6-Clicks’ Inpatient Daily Activity Short Form  -   Putting on and taking off regular lower body clothing?: A Little  -   Bathing (including washing, rinsing, drying)?: A Little  -   Toileting, which includes using toilet, bedpan or urinal? : None  -   Putting on and taking off regular upper body clothing?: None  -    Taking care of personal grooming such as brushing teeth?: None  -   Eating meals?: None    AM-PAC Score:  Score: 22  Approx Degree of Impairment: 25.8%  Standardized Score (AM-PAC Scale): 47.1      ADDITIONAL TESTS     NEUROLOGICAL FINDINGS        PLAN   Patient has been evaluated and presents with no skilled Occupational Therapy needs at this time.  Patient discharged from Occupational Therapy services.  Please re-order if a new functional limitation presents during this admission.      Patient Evaluation Complexity Level:   Occupational Profile/Medical History LOW - Brief history including review of medical or therapy records    Specific performance deficits impacting engagement in ADL/IADL LOW  1 - 3 performance deficits    Client Assessment/Performance Deficits MODERATE - Comorbidities and min to mod modifications of tasks    Clinical Decision Making LOW - Analysis of occupational profile, problem-focused assessments, limited treatment options    Overall Complexity LOW     OT Session Time: 30 minutes  Self-Care Home Management: 18 minutes  Therapeutic Activity:  10 minutes

## 2024-08-02 NOTE — PHYSICAL THERAPY NOTE
PHYSICAL THERAPY EVALUATION - INPATIENT     Room Number: 357/357-A  Evaluation Date: 8/2/2024  Type of Evaluation: Initial  Physician Order: PT Eval and Treat    Presenting Problem: TLIF & L4-L5 lami  Co-Morbidities : HTN, COPD, spinal stenosis  Reason for Therapy: Mobility Dysfunction and Discharge Planning    PHYSICAL THERAPY ASSESSMENT   Patient is a 68 year old male admitted 8/1/2024 for s/p TLIF and L4-L5 fusion.   Patient is currently functioning near baseline with bed mobility, transfers, gait, stair negotiation, maintaining seated position, and standing prolonged periods. Prior to admission, patient's baseline is ind.       PLAN  Patient has been evaluated and presents with no skilled Physical Therapy needs at this time.  Patient discharged from Physical Therapy services.  Please re-order if a new functional limitation presents during this admission.    GOALS  Patient was able to achieve the following goals ...    Patient was able to transfer At previous, functional level   Patient able to ambulate on level surfaces Safely with RW         HOME SITUATION  Type of Home: House   Home Layout: Two level  Stairs to Enter : 1     Stairs to Bedroom: 16  Railing: Yes    Lives With: Spouse;Family  Drives: Yes  Patient Owned Equipment: None  Patient Regularly Uses: Glasses    Prior Level of [place]: Pt reports that he is ind with all mobility and ADLs PTA. Pt states that he lives with spouse and family who can assist as needed. Pt still driving.    SUBJECTIVE  \"BLT like the sandwich?\"      OBJECTIVE  Precautions: Spine;Bed/chair alarm  Fall Risk: Standard fall risk    WEIGHT BEARING RESTRICTION  Weight Bearing Restriction: None                PAIN ASSESSMENT  Rating: 3  Location: back  Management Techniques: Activity promotion;Body mechanics;Breathing techniques;Repositioning    COGNITION  Overall Cognitive Status:  WFL - within functional limits    RANGE OF MOTION AND STRENGTH ASSESSMENT  Upper extremity  ROM and strength are within functional limits     Lower extremity ROM is within functional limits     Lower extremity strength is within functional limits       BALANCE  Static Sitting: Good  Dynamic Sitting: Good  Static Standing: Fair -  Dynamic Standing: Fair -    ADDITIONAL TESTS                                    ACTIVITY TOLERANCE                         O2 WALK       NEUROLOGICAL FINDINGS                        AM-PAC '6-Clicks' INPATIENT SHORT FORM - BASIC MOBILITY  How much difficulty does the patient currently have...  Patient Difficulty: Turning over in bed (including adjusting bedclothes, sheets and blankets)?: None   Patient Difficulty: Sitting down on and standing up from a chair with arms (e.g., wheelchair, bedside commode, etc.): None   Patient Difficulty: Moving from lying on back to sitting on the side of the bed?: None   How much help from another person does the patient currently need...   Help from Another: Moving to and from a bed to a chair (including a wheelchair)?: None   Help from Another: Need to walk in hospital room?: A Little   Help from Another: Climbing 3-5 steps with a railing?: A Little       AM-PAC Score:  Raw Score: 22   Approx Degree of Impairment: 20.91%   Standardized Score (AM-PAC Scale): 53.28   CMS Modifier (G-Code): CJ    FUNCTIONAL ABILITY STATUS  Gait Assessment   Functional Mobility/Gait Assessment  Gait Assistance: Supervision  Distance (ft): 150  Assistive Device: Rolling walker  Pattern: Within Functional Limits  Stairs: Stairs;Car transfer  How Many Stairs: 4  Device: 1 Rail  Assist: Contact guard assist  Pattern: Ascend and Descend  Ascend and Descend : Reciprocal  Car transfer: supervision v/c for sequencing and maintaining precautions    Skilled Therapy Provided     Bed Mobility:  Rolling: NT  Supine to sit: supervision// v/c for log rolling sequencing    Sit to supine: NT     Transfer Mobility:  Sit to stand: supervision// v/c to push off bed rather than  RW   Stand to sit: supervision  Gait = see assessment above.    Therapist's comments:Pt received resting in bed and was agreeable to PT. Pt states pain is low and tolerable. Pt educated on spine precautions (no bending, lifting or twisting) and is able to recite them back. Pt able to perform bed mobility with supervision with v/c for sequencing and spine precautions. Pt stood to RW with supervision and amb 150 ft with no LOB but states a little dizziness. Pt denied a rest break in the chair. Pt ascended and descended 4 steps with reciprocal gait pattern with CGA. Pt also practiced car transfers with supervision and v/c for sequencing to maintain precautions. Pt returned to room and was left in chair. Pt educated on continued amb 4x/day with nursing staff and OOB activity. Pt to be given a RW upon discharge. RN aware.    Exercise/Education Provided:  Bed mobility  Body mechanics  Energy conservation  Functional activity tolerated  Gait training  Transfer training    Patient End of Session: Up in chair;Needs met;Call light within reach;RN aware of session/findings;All patient questions and concerns addressed;SCDs in place;Alarm set    Patient Evaluation Complexity Level:  History Low - no personal factors and/or co-morbidities   Examination of body systems Low - addressing 1-2 elements   Clinical Presentation Low - Stable   Clinical Decision Making Low Complexity       PT Session Time: 20 minutes  Gait Training: 15 minutes

## 2024-08-02 NOTE — PLAN OF CARE
Patient A & O x4. VSS, on RA. C/o mild pain, see MAR for meds given. Dressing to back has small amount of serosanguinous drainage noted. Voiding freely. Up min assist. Safety measures in place. Instructed to use call light.

## 2024-08-02 NOTE — PROGRESS NOTES
S: Patient laying in bed. States his back pain is well controlled. Denies leg pain. Denies numbness or tingling. Baseline left foot weakness. Ambulated. Voiding freely. Passing gas. Wants to go home.     Inspection:  Awake alert No acute distress. No difficulty breathing     Blood pressure 118/52, pulse 68, temperature 98.2 °F (36.8 °C), temperature source Oral, resp. rate 16, height 6' 2\" (1.88 m), weight 166 lb (75.3 kg), SpO2 97%.    Recent Labs   Lab 08/02/24  0440   HGB 12.0*   HCT 36.0*       Lumbar/Sacral Integument: Incision/ incisions;  Dressing in place,    Strength: Strength of bilateral lower extremities:     Left Right    EHL 4/5 5/5    DF 4/5 5/5    PF 5/5 5/5    Quads 5/5 5/5    IP 5/5 5/5  Sensation: No sensory deficits noted on bilateral lower extremities      HEAD/NECK: Head is normocephalic  EYES: EOMI, DONOVAN  SKIN EXAM: Skin is intact, head, neck, trunk and arms/legs. No rashes, mottling or ulcerations.  LYMPH EXAM: There is no lymph edema in either lower extremity.  VASCULAR EXAM:  Good distal perfusion. No clubbing or cyanosis.Calves supple, NT bilaterally  ABDOMINAL EXAM: Abdomen is soft, NT.      A/P: POD # 1 s/p L4-5 Lami, L5-S1 TLIF    P: Patient doing very well. Continue current management. Can be discharged home once cleared by all services. Can reviewed with Dr. Romo who agrees with the above plan.     Judit Gallego PA-C

## 2025-01-25 ENCOUNTER — APPOINTMENT (OUTPATIENT)
Dept: GENERAL RADIOLOGY | Facility: HOSPITAL | Age: 70
End: 2025-01-25
Attending: EMERGENCY MEDICINE
Payer: MEDICARE

## 2025-01-25 ENCOUNTER — HOSPITAL ENCOUNTER (EMERGENCY)
Facility: HOSPITAL | Age: 70
Discharge: HOME | End: 2025-01-25
Attending: EMERGENCY MEDICINE
Payer: MEDICARE

## 2025-01-25 VITALS
DIASTOLIC BLOOD PRESSURE: 96 MMHG | OXYGEN SATURATION: 100 % | SYSTOLIC BLOOD PRESSURE: 179 MMHG | HEART RATE: 89 BPM | RESPIRATION RATE: 20 BRPM | TEMPERATURE: 98 F

## 2025-01-25 DIAGNOSIS — S82.044A CLOSED NONDISPLACED COMMINUTED FRACTURE OF RIGHT PATELLA, INITIAL ENCOUNTER: Primary | ICD-10-CM

## 2025-01-25 PROCEDURE — 73560 X-RAY EXAM OF KNEE 1 OR 2: CPT | Performed by: EMERGENCY MEDICINE

## 2025-01-25 PROCEDURE — 99283 EMERGENCY DEPT VISIT LOW MDM: CPT

## 2025-01-25 PROCEDURE — 99284 EMERGENCY DEPT VISIT MOD MDM: CPT

## 2025-01-25 RX ORDER — HYDROCODONE BITARTRATE AND ACETAMINOPHEN 5; 325 MG/1; MG/1
1 TABLET ORAL EVERY 8 HOURS PRN
Qty: 5 TABLET | Refills: 0 | Status: SHIPPED | OUTPATIENT
Start: 2025-01-25 | End: 2025-01-30

## 2025-01-25 RX ORDER — IBUPROFEN 600 MG/1
600 TABLET, FILM COATED ORAL ONCE
Status: COMPLETED | OUTPATIENT
Start: 2025-01-25 | End: 2025-01-25

## 2025-01-25 RX ORDER — HYDROCODONE BITARTRATE AND ACETAMINOPHEN 5; 325 MG/1; MG/1
1 TABLET ORAL ONCE
Status: COMPLETED | OUTPATIENT
Start: 2025-01-25 | End: 2025-01-25

## 2025-01-25 NOTE — ED INITIAL ASSESSMENT (HPI)
S: mechanical fall onto right knee. Pt unable to stand.    B: see chart.     A: difficult to assess in triage    R: edso

## 2025-01-26 NOTE — ED PROVIDER NOTES
[facility]  Emergency Department Attending Note     Chief Complaint:   Chief Complaint   Patient presents with    Knee Pain     HISTORY OF PRESENT ILLNESS:   Irving Mayer is a 69 year old male who presents to the ED presents with a complaint of right knee pain after mechanical slip and fall.  States he slipped, landed on his right knee directly and immediately had pain.  Had difficulty bearing weight.  Denies paralysis paresthesia.  Denies obvious deformity.  Denies head trauma or LOC.     MEDICAL & SOCIAL HISTORY:   Past Medical History:    Back problem    Chronic airway obstruction, not elsewhere classified    Chronic obstructive pulmonary disease, unspecified COPD type (HCC)    COPD (chronic obstructive pulmonary disease) (HCC)    DDD (degenerative disc disease), lumbosacral    Essential hypertension    Essential hypertension with goal blood pressure less than 130/80    High blood pressure    High cholesterol    HTN (hypertension)    Hypercholesterolemia    Multiple pulmonary nodules    Pure hypercholesterolemia    Tobacco abuse    Visual impairment    Vitamin D deficiency      Past Surgical History:   Procedure Laterality Date    Back surgery  1982    Colonoscopy  11/10/14= Hemorrhoids    Repeat 2019    Colonoscopy  10/10/18= Hemorrhoids    Repeat 2023    Colonoscopy & polypectomy      Knee surgery  1980    [facility]    Other surgical history  11/13/2017    Cystoscopy- Dr. Guerrero     Other surgical history  07/06/2023    R knee arthroscopy with partial medial menisectomy      Social History     Socioeconomic History    Marital status:     Number of children: 3    Highest education level: High school graduate   Occupational History    Occupation: animal hospital technician     Comment: retired   Tobacco Use    Smoking status: Former     Current packs/day: 0.00     Average packs/day: 1 pack/day for 40.0 years (40.0 ttl pk-yrs)     Types: Cigarettes     Start date: 3/3/1975     Quit  date: 3/3/2015     Years since quittin.9    Smokeless tobacco: Never   Vaping Use    Vaping status: Never Used   Substance and Sexual Activity    Alcohol use: Yes     Alcohol/week: 60.0 standard drinks of alcohol     Types: 60 Cans of beer per week     Comment: 8-10  daily    Drug use: No     Social Drivers of Health     Food Insecurity: No Food Insecurity (2024)    Food Insecurity     Food Insecurity: Never true   Transportation Needs: No Transportation Needs (2024)    Transportation Needs     Lack of Transportation: No   Housing Stability: Low Risk  (2024)    Housing Stability     Housing Instability: No    Allergies[1]   Current Outpatient Medications   Medication Sig Dispense Refill    HYDROcodone-acetaminophen 5-325 MG Oral Tab Take 1 tablet by mouth every 8 (eight) hours as needed for Pain. 5 tablet 0    aspirin 81 MG Oral Tab EC Take 1 tablet (81 mg total) by mouth daily.      alendronate 70 MG Oral Tab Take 1 tablet (70 mg total) by mouth once a week.      oxyCODONE-acetaminophen  MG Oral Tab Take 1-2 tablets by mouth every 4 (four) hours as needed for Pain. 30 tablet 0    methocarbamol 750 MG Oral Tab Take 1 tablet (750 mg total) by mouth 3 (three) times daily. 40 tablet 0    gabapentin 300 MG Oral Cap Take 1 capsule (300 mg total) by mouth in the morning, at noon, and at bedtime.      tiZANidine 4 MG Oral Tab Take 1 tablet (4 mg total) by mouth every 8 (eight) hours as needed.      traMADol 50 MG Oral Tab Take 1-2 mg by mouth at bedtime.      Calcium Carb-Cholecalciferol (CALCIUM 500 + D OR) Take 1 tablet by mouth daily.      Cyanocobalamin (VITAMIN B 12 OR) Take 1 tablet by mouth daily.      SIMVASTATIN 40 MG Oral Tab TAKE 1 TABLET(40 MG) BY MOUTH EVERY NIGHT 90 tablet 0    lisinopril 20 MG Oral Tab Take 1 tablet (20 mg total) by mouth daily. 90 tablet 3    Sildenafil Citrate 50 MG Oral Tab Take 1 tablet (50 mg total) by mouth daily as needed for Erectile Dysfunction. 30 tablet 2           REVIEW OF SYSTEMS   A 10 point review of systems was completed and is negative except as listed in history of present illness      PHYSICAL EXAM   Vitals: BP (!) 179/96   Pulse 89   Temp 97.9 °F (36.6 °C)   Resp 20   SpO2 100%   BP (!) 179/96   Pulse 89   Temp 97.9 °F (36.6 °C)   Resp 20   SpO2 100%     General: A&Ox3, NAD  Constitutional: Well developed  Head: atraumatic, normocephalic   Eyes: conjuctiva clear, no icterus  Ears: normal external appearance, no drainage  Nose:  Atraumatic, no swelling, no drainage, nares patent  Throat:  Moist pink mucous membranes, airway is patent  Neck:  Soft supple, no masses\  Chest:  No bruising or abrasions, no tenderness, no deformity  Cardiac:  Regular rate and rhythm  Lung:  No distress, no retractions  Back: No stepoff/deformity no midline back tenderness  Extremities: Intact dorsalis pedis and posterior tibialis pulses, intact popliteal pulse, full range of motion to the digits, normal sensation to all digits and to the entire distal extremity, some tenderness just over the patella, able to lift the leg off of the cart but has tenderness and pain when he tries to flex the knee.  No laxity.  Normal distal capillary refill less than 2 seconds, full range of motion of the ankle  Neuro: No facial droop, no slurred speech, moving all extremities freely, SILT to the bilateral upper and lower extremities  Psych: A&Ox3, normal affect, cooperative, calm  Skin: No rash, no petechiae/purpura, warm, dry      RESULTS  LABS:   Results for orders placed or performed during the hospital encounter of 08/01/24   Hemoglobin & Hematocrit    Collection Time: 08/02/24  4:40 AM   Result Value Ref Range    HGB 12.0 (L) 13.0 - 17.5 g/dL    HCT 36.0 (L) 39.0 - 53.0 %         IMAGING: XR KNEE (1 OR 2 VIEWS), RIGHT (CPT=73560)    Result Date: 1/25/2025  CONCLUSION:  1. Acute nondisplaced comminuted patellar fracture.  Posttraumatic hemarthrosis. 2. Tricompartmental osteoarthritis  including advanced medial compartment osteoarthritis. 3. Atherosclerotic vascular calcification.    Dictated by (CST): Roberto Carlos Puentes MD on 1/25/2025 at 6:55 PM     Finalized by (CST): Roberto Carlos Puentes MD on 1/25/2025 at 6:56 PM           I personally reviewed the radiology study and my finding were patellar fracture noted      Procedures:   Procedures       ED COURSE          Re-Evaluation: Improved      Disposition & Plan:   Clinical Impression/Final Diagnosis:   1. Closed nondisplaced comminuted fracture of right patella, initial encounter        Medical Decision Making: Patellar fracture noted, no evidence of neurovascular injury on my exam however had a long discussion with the patient regarding important return precautions and the patient assures me he will follow-up with his doctor and his orthopedic doctor in the next day or 2 and return immediately any worsening symptoms or new concerns.  Patient has strict instructions for nonweightbearing, knee immobilizer, crutches, analgesics given in the emergency department will discharge home.  Patient verbalized understanding and agreement with plan    Medical Decision Making  Amount and/or Complexity of Data Reviewed  Independent Historian: spouse  External Data Reviewed: notes.  Radiology: ordered and independent interpretation performed. Decision-making details documented in ED Course.    Risk  OTC drugs.  Prescription drug management.  Decision regarding hospitalization.        *Please note that in the presenting to the emergency department, illness/injury that poses a threat to life or function is considered during this patient's initial evaluation.    The complexity of this visit is therefore inherently more complex given the need to consider life threatening pathology prior to any other etiology for this patient's visit.    The medical decision above exemplifies this rationale.     Disposition: Discharge  There are no disposition comments on file for this  visit.     This note was generated in part using voice recognition dictation technology. The report was reviewed by this physician but still may have unintentional errors due to inherent limitations of voice recognition technology. All times are estimates.         [1]   Allergies  Allergen Reactions    Seasonal

## 2025-01-26 NOTE — ED QUICK NOTES
Knee Immobilized applied per Ed tech. Crutches and instructions per ED Tech and revied per myself and ED MD

## (undated) NOTE — LETTER
OUTSIDE TESTING RESULT REQUEST     IMPORTANT: FOR YOUR IMMEDIATE ATTENTION  Please FAX all test results listed below to: [phone]     Testing already done on or about: 24     * * * * If testing is NOT complete, arrange with patient A.S.A.P. * * * *      Patient Name: Irving Mayer  Surgery Date: 2024  Medical Record: [id number]  CSN: [id number]  : 8/10/1955 - A: 68 y     Sex: male  Surgeon(s):  Juan Romo MD  Procedure: LEFT LUMBAR 4 - LUMBAR 5 LAMINECTOMY, LUMBAR 5 - SACRUM 1 TRANSFORAMINAL LUMBAR INTERBODY FUSION BILATERAL  Anesthesia Type: General     Surgeon: Juan Romo MD     The following Testing and Time Line are REQUIRED PER ANESTHESIA     EKG READ AND SIGNED WITHIN   90 days  Chest X-Ray within 6 months  CBC [with Differential & Platelets] within  90 days  CMP (requires 4 hour fast) within  90 days  PT/INR within  30 days  PTT within  30 days  UA with Reflex to Culture within  30 days  MSSA/MRSA Nasal screening within 30 days      Thank You,   Sent by:Zeinab